# Patient Record
Sex: MALE | Race: OTHER
[De-identification: names, ages, dates, MRNs, and addresses within clinical notes are randomized per-mention and may not be internally consistent; named-entity substitution may affect disease eponyms.]

---

## 2019-08-26 NOTE — NUR
RN TELE ADMITTING NOTES 

RECEIVED PT FROM ER, VIA YESSICA, AWAKE ALERT ORIENTEDX4, ABLE TO AMBULATE TO BED. BREATHING 
MINIMALLY LABORED ON ROOM AIR BUT SATING AT 98%. COMPLAINS OF CHEST PRESSURE RADIATING TO 
THE LEFT ARM, REPORTS HE HAS NOT TAKEN HIS BP MEDS IN ABOUT 2 MONTHS BECAUSE HE DOES NOT 
KNOW WHICH ONES THEY ARE. APPEARS SHAKY, REPORTS TAKING LIBRIUM IN THE MORNING, TRYING TO 
QUIT ALCOHOL, [facility] IV ACCESS ON THE L FA18G SL. 
BELONGINGS LIST COMPLETED, CONNECTED TO HEART MONITOR, SR IN THE 70S. BED IN LOWEST LOCKED 
POSITION, CALL LIGHT WITHIN REACH AT ALL TIMES, WILL CONTINUE TO MONITOR FREQUENTLY.

## 2019-08-26 NOTE — NUR
REPORT GIVEN TO BRO SARKAR FOR SHAWN; [facility]

## 2019-08-26 NOTE — NUR
"[id number], FROM [place] IN THE BOX, C/O CHEST PAIN FOR YEARS AND WORST TODAY

+N/V x 2, 2 SPRAY OF NITRO  ASA GIVEN BY EMS, ADMITS DRINKING TODAY" PT 
AAOX4, -SOB, NAD NOTED, VSS ,PENDING MD LAWSON

## 2019-08-27 NOTE — NUR
RN CLOSING NOTES

PT AWAKE AND RESTING IN BED. PATIENT GIVEN LIBRIUM X2 TODAY FOR ALCOHOL WITHDRAWAL SYMPTOMS. 
PT HAS LEFT FA #18 IV INTACT AND PATENT, AND RIGHT AC #18 IV INTACT AND RUNNING NS @75ML/HR. 
SAFETY PRECAUTIONS IN PLACE, BED IN LOWEST LOCKED POSITION, X2 SIDE RAILS UP AND CALL LIGHT 
WITHIN REACH. WILL ENDORSE TO NIGHT SHIFT NURSE FOR CONTINUITY OF CARE.

## 2019-08-27 NOTE — NUR
MS RN OPENING NOTES



Patient received in bed, alert, oriented x 4. Breathing even and unlabored. Not in any 
distress, on room air. Peripheral IV infusing at 75mL/hr. No complaints of pain at this 
time. Safety measures in place; call light within reach, bed in low, locked position. Will 
continue to monitor accordingly

## 2019-08-27 NOTE — NUR
RN TELE NOTES - PT /102, CONTINUES TO COMPLAIN OF CHEST PAIN, ANA MARIA PACHECO, ORDERED 
1GM OF NITRO OINTMENT, WILL ADMINISTER AS PRESCRIBED.

## 2019-08-27 NOTE — NUR
RN TELE CLOSING NOTES 

PT REMAINS IN BED, SLEEPING EASILY AROUSED TO NAME CALL. BREATHING EVEN AND UNLABORED ON 
ROOM AIR. IN NO APPARENT PAIN OR DISCOMFORT AT THIS TIME.  IV ACCESS ON THE L FA18G NS 
@75ML/HR.  CONNECTED TO HEART MONITOR, SR IN THE 70S. BED IN LOWEST LOCKED POSITION, CALL 
LIGHT WITHIN REACH AT ALL TIMES, WILL ENDORSE TO DAY NURSE FOR CO C

## 2019-08-27 NOTE — NUR
RN NOTES

INFORMED PHARMACY THAT NITROL OINTMENT NOT IN PATIENT CASSETTE. WILL CONTINUE TO FOLLOW UP.

## 2019-08-28 NOTE — NUR
Nurse Notes:

    Report given the the night nurse, Ev RN, Librium is not scheduled, was seen by Dr Vargas.  patient still having chest pain.

## 2019-08-28 NOTE — NUR
Nurse Notes:

   started on magnesium bolus x 2, 1 hr each,  IV is in the right antecubital, it is not in 
the right forearm.  left arm is in the forearm.

## 2019-08-28 NOTE — NUR
RN CLOSING NOTES



PATIENT SLEEPING IN BED, EASY TO AROUSE. BREATHING EVEN AND UNLABORED. NOT IN ANY DISTRESS. 
PATIENT HAS LEFT FA #18 IV INTACT AND PATENT, AND RIGHT AC #18 IV INTACT AND PATENT WITH 
PERIPHERAL IV RUNNING NS @75ML/HR. SAFETY PRECAUTIONS IN PLACE, BED IN LOWEST LOCKED 
POSITION, X2 SIDE RAILS UP AND CALL LIGHT WITHIN REACH. ENDORSED SHAWN TO AM SHIFT RN

## 2019-08-28 NOTE — NUR
Nurse Notes:

    Dr Vargas is here on floor, patient librium needs to be ordered every 6 hours scheduled, 
and not prn.  Patient states gets shakiy.

## 2019-08-28 NOTE — NUR
Nurse Notes:

   assisted to the bathroom, using walker, patient stated feels wobbly,  patient had bowel 
movement,  stool was not observed.  patient flushed it.

## 2019-08-28 NOTE — NUR
MS RN NOTES

RECEIVED ON BED A/O X4,NO SOB BUT STILL HAVING CHEST PAIN.SEEN AND EXAMINED BY DR COATES,WITH NEW ORDERS NOTED.IVF DISCONTINUED AS ORDERED.NITRO PATCH IN PLACE ON LEFT CHEST 
WALL.CALL LIGHT IN REACH,NEEDS ANTICIPATED.

## 2019-08-29 NOTE — NUR
MS RN NOTES

SLEPT WELL WITH LIBRIUM.DENIES CHEST PAIN.SALINE LOCK,IV DISCONTINUE BY DR COATES.POSSIBLE 
DISCHARGE HOME TODAY.WILL ENDORSE TO DAY NURSE FOR SHAWN.

## 2019-08-29 NOTE — NUR
DISCHARGE NOTE



PT WAS D/C AT THIS TIME BACK IN MEDICALLY STABLE CONDITION. IV AND ID BAND WERE REMOVED. ALL 
EXITCARE, D/C PAPERWORK, AND BELONGINGS LIST WERE SIGNED, DISCUSSED AND HANDED TO THE PT. 
ALL BELONGINGS WERE TAKEN BY HIM AS WELL.MEDICATIONS WERE RETURNED TO HIM. SKIN WAS NOTED TO 
BE INTACT. ALL NEEDS WERE ATTENDED TO DURING HIS STAY. PT LEFT AT THIS TIME WHERE HE WAS 
WALKED DOWN BY ME. HE WAS GIVEN TAP CARD TO RIDE THE BUS BACK HOME AT THIS TIME

## 2019-08-29 NOTE — NUR
RN OPENING NOTE



PT WAS RECEIVED IN BED AT LOWEST AND LOCKED POSITION WITH SIDE RAILS UP X2, A/O X4 BREATHING 
EVEN AND UNLABORED ON RA, NO S/S OF ANY DISTRESS OR PAIN, IV IS PATENT AND INTACT, D/C 
PLANNING, SAFETY PRECAUTIONS IN PLACE, CALL LIGHT WITHIN REACH, WILL MONITOR PT ACCORDINGLY

## 2020-01-05 ENCOUNTER — HOSPITAL ENCOUNTER (EMERGENCY)
Dept: HOSPITAL 54 - ER | Age: 60
Discharge: HOME | End: 2020-01-05
Payer: COMMERCIAL

## 2020-01-05 VITALS — HEIGHT: 73 IN | WEIGHT: 221 LBS | BODY MASS INDEX: 29.29 KG/M2

## 2020-01-05 VITALS — SYSTOLIC BLOOD PRESSURE: 101 MMHG | DIASTOLIC BLOOD PRESSURE: 67 MMHG

## 2020-01-05 DIAGNOSIS — I10: ICD-10-CM

## 2020-01-05 DIAGNOSIS — R07.89: Primary | ICD-10-CM

## 2020-01-05 DIAGNOSIS — E78.00: ICD-10-CM

## 2020-01-05 DIAGNOSIS — Z79.899: ICD-10-CM

## 2020-01-05 LAB
ALBUMIN SERPL BCP-MCNC: 3.5 G/DL (ref 3.4–5)
ALP SERPL-CCNC: 66 U/L (ref 46–116)
ALT SERPL W P-5'-P-CCNC: 61 U/L (ref 12–78)
AST SERPL W P-5'-P-CCNC: 91 U/L (ref 15–37)
BASOPHILS # BLD AUTO: 0.1 /CMM (ref 0–0.2)
BASOPHILS NFR BLD AUTO: 2.4 % (ref 0–2)
BILIRUB DIRECT SERPL-MCNC: 0.2 MG/DL (ref 0–0.2)
BILIRUB SERPL-MCNC: 0.6 MG/DL (ref 0.2–1)
BUN SERPL-MCNC: 18 MG/DL (ref 7–18)
CALCIUM SERPL-MCNC: 8.4 MG/DL (ref 8.5–10.1)
CHLORIDE SERPL-SCNC: 101 MMOL/L (ref 98–107)
CO2 SERPL-SCNC: 31 MMOL/L (ref 21–32)
CREAT SERPL-MCNC: 1.3 MG/DL (ref 0.6–1.3)
EOSINOPHIL NFR BLD AUTO: 2.4 % (ref 0–6)
GLUCOSE SERPL-MCNC: 90 MG/DL (ref 74–106)
HCT VFR BLD AUTO: 38 % (ref 39–51)
HGB BLD-MCNC: 13 G/DL (ref 13.5–17.5)
LYMPHOCYTES NFR BLD AUTO: 2.1 /CMM (ref 0.8–4.8)
LYMPHOCYTES NFR BLD AUTO: 42.1 % (ref 20–44)
MCHC RBC AUTO-ENTMCNC: 34 G/DL (ref 31–36)
MCV RBC AUTO: 98 FL (ref 80–96)
MONOCYTES NFR BLD AUTO: 0.5 /CMM (ref 0.1–1.3)
MONOCYTES NFR BLD AUTO: 10.5 % (ref 2–12)
NEUTROPHILS # BLD AUTO: 2.1 /CMM (ref 1.8–8.9)
NEUTROPHILS NFR BLD AUTO: 42.6 % (ref 43–81)
NT-PROBNP SERPL-MCNC: 136 PG/ML (ref 0–125)
PLATELET # BLD AUTO: 147 /CMM (ref 150–450)
POTASSIUM SERPL-SCNC: 3.2 MMOL/L (ref 3.5–5.1)
PROT SERPL-MCNC: 7.3 G/DL (ref 6.4–8.2)
RBC # BLD AUTO: 3.88 MIL/UL (ref 4.5–6)
SODIUM SERPL-SCNC: 142 MMOL/L (ref 136–145)
WBC NRBC COR # BLD AUTO: 4.9 K/UL (ref 4.3–11)

## 2020-01-05 NOTE — NUR
PT PRESENTED TO THE ER WITH A C/O L SIDED SHARP CP THAT RADIATES TO THE LUE. PT 
STATED THAT HIS BILATERAL HANDS ARE NUMB. PT ALSO STATED THAT HE IS HAVING 
BLOOD IN HIS STOOL AND HAS DIARRHEA. PT STATED THAT HE DOES NOT SMOKE, BUT HAS 
PROBLEMS WITH ALCOHOL. PT STATED THAT HE HAS LOST A LOT OF WEIGHT IN THE LAST 
YEAR AND HAS LOST HIS APPETITE. PT WAS PLACED ON THE MONITOR AND CONTINUOUS 
PULSE OX. IV WAS STARTED IN [facility] AND BLOOD WAS DRAWN AND SENT TO LAB. WILL 
CONTINUE TO MONITOR THE PT.

## 2020-01-05 NOTE — NUR
IV removed. Catheter intact and site benign. Pressure and 4x4 applied to site. 
No bleeding noted. Patient discharged to home in stable condition. Written and 
verbal after care instructions given. Patient verbalizes understanding of 
instruction. PT REC'D A COPY OF HIS IMAGING FINDINGS AND ALL LABS. PT AMBULATED 
OUT WITH A STEADY GAIT. VSS.

## 2020-01-05 NOTE — NUR
PT LEFT BEFORE REC'ING THE TAP CARD. CALLED FOR PT IN LOBBY AND OUTSIDE, BUT 
WAS UNABLE TO FIND PT.

## 2020-03-14 ENCOUNTER — HOSPITAL ENCOUNTER (EMERGENCY)
Dept: HOSPITAL 54 - ER | Age: 60
Discharge: HOME | End: 2020-03-14
Payer: COMMERCIAL

## 2020-03-14 VITALS — HEIGHT: 72 IN | WEIGHT: 190 LBS | BODY MASS INDEX: 25.73 KG/M2

## 2020-03-14 VITALS — DIASTOLIC BLOOD PRESSURE: 72 MMHG | SYSTOLIC BLOOD PRESSURE: 133 MMHG

## 2020-03-14 DIAGNOSIS — M54.10: ICD-10-CM

## 2020-03-14 DIAGNOSIS — I10: ICD-10-CM

## 2020-03-14 DIAGNOSIS — K92.2: ICD-10-CM

## 2020-03-14 DIAGNOSIS — Z79.899: ICD-10-CM

## 2020-03-14 DIAGNOSIS — F32.9: ICD-10-CM

## 2020-03-14 DIAGNOSIS — R60.0: Primary | ICD-10-CM

## 2020-03-14 LAB
ALBUMIN SERPL BCP-MCNC: 4.1 G/DL (ref 3.4–5)
ALP SERPL-CCNC: 77 U/L (ref 46–116)
ALT SERPL W P-5'-P-CCNC: 74 U/L (ref 12–78)
AST SERPL W P-5'-P-CCNC: 73 U/L (ref 15–37)
BASOPHILS # BLD AUTO: 0.2 /CMM (ref 0–0.2)
BASOPHILS NFR BLD AUTO: 2.7 % (ref 0–2)
BILIRUB DIRECT SERPL-MCNC: 0.1 MG/DL (ref 0–0.2)
BILIRUB SERPL-MCNC: 0.4 MG/DL (ref 0.2–1)
BUN SERPL-MCNC: 15 MG/DL (ref 7–18)
CALCIUM SERPL-MCNC: 9.3 MG/DL (ref 8.5–10.1)
CHLORIDE SERPL-SCNC: 100 MMOL/L (ref 98–107)
CO2 SERPL-SCNC: 31 MMOL/L (ref 21–32)
CREAT SERPL-MCNC: 1.5 MG/DL (ref 0.6–1.3)
EOSINOPHIL NFR BLD AUTO: 4.6 % (ref 0–6)
GLUCOSE SERPL-MCNC: 93 MG/DL (ref 74–106)
HCT VFR BLD AUTO: 39 % (ref 39–51)
HGB BLD-MCNC: 13.2 G/DL (ref 13.5–17.5)
LYMPHOCYTES NFR BLD AUTO: 2.3 /CMM (ref 0.8–4.8)
LYMPHOCYTES NFR BLD AUTO: 35.8 % (ref 20–44)
MCHC RBC AUTO-ENTMCNC: 34 G/DL (ref 31–36)
MCV RBC AUTO: 102 FL (ref 80–96)
MONOCYTES NFR BLD AUTO: 0.5 /CMM (ref 0.1–1.3)
MONOCYTES NFR BLD AUTO: 7.1 % (ref 2–12)
NEUTROPHILS # BLD AUTO: 3.2 /CMM (ref 1.8–8.9)
NEUTROPHILS NFR BLD AUTO: 49.8 % (ref 43–81)
PLATELET # BLD AUTO: 240 /CMM (ref 150–450)
POTASSIUM SERPL-SCNC: 4.2 MMOL/L (ref 3.5–5.1)
PROT SERPL-MCNC: 8.1 G/DL (ref 6.4–8.2)
RBC # BLD AUTO: 3.79 MIL/UL (ref 4.5–6)
SODIUM SERPL-SCNC: 138 MMOL/L (ref 136–145)
WBC NRBC COR # BLD AUTO: 6.4 K/UL (ref 4.3–11)

## 2020-03-14 NOTE — NUR
PT  REC'D TO ER  C/O  CP  ON AND OFF FOR  YEARS   TODAY  PRESSURE AND  NUMBNESS 
 FINGERS     SR  EKG AWAITING EVALUATION BY ER PROVIDER.

## 2020-03-14 NOTE — NUR
PT. VERBALIZED UNDERSTANDING OF AFTERCARE INSTRUCTIONS.IV removed. Catheter 
intact and site benign. Pressure and 4x4 applied to site. No bleeding noted.

## 2021-02-07 ENCOUNTER — HOSPITAL ENCOUNTER (INPATIENT)
Dept: HOSPITAL 54 - ER | Age: 61
LOS: 8 days | Discharge: HOME | End: 2021-02-15
Attending: NURSE PRACTITIONER | Admitting: INTERNAL MEDICINE
Payer: COMMERCIAL

## 2021-02-07 VITALS — WEIGHT: 210 LBS | HEIGHT: 72 IN | BODY MASS INDEX: 28.44 KG/M2

## 2021-02-07 DIAGNOSIS — I10: ICD-10-CM

## 2021-02-07 DIAGNOSIS — R09.02: ICD-10-CM

## 2021-02-07 DIAGNOSIS — F10.231: Primary | ICD-10-CM

## 2021-02-07 DIAGNOSIS — N13.9: ICD-10-CM

## 2021-02-07 DIAGNOSIS — J39.2: ICD-10-CM

## 2021-02-07 DIAGNOSIS — J45.909: ICD-10-CM

## 2021-02-07 DIAGNOSIS — E66.9: ICD-10-CM

## 2021-02-07 DIAGNOSIS — R74.01: ICD-10-CM

## 2021-02-07 DIAGNOSIS — E87.5: ICD-10-CM

## 2021-02-07 DIAGNOSIS — Z79.51: ICD-10-CM

## 2021-02-07 DIAGNOSIS — M48.02: ICD-10-CM

## 2021-02-07 DIAGNOSIS — Z79.01: ICD-10-CM

## 2021-02-07 DIAGNOSIS — I16.0: ICD-10-CM

## 2021-02-07 DIAGNOSIS — E83.42: ICD-10-CM

## 2021-02-07 DIAGNOSIS — F41.9: ICD-10-CM

## 2021-02-07 DIAGNOSIS — J39.1: ICD-10-CM

## 2021-02-07 DIAGNOSIS — E87.6: ICD-10-CM

## 2021-02-07 DIAGNOSIS — G92: ICD-10-CM

## 2021-02-07 DIAGNOSIS — F17.200: ICD-10-CM

## 2021-02-07 DIAGNOSIS — N17.0: ICD-10-CM

## 2021-02-07 DIAGNOSIS — Z59.0: ICD-10-CM

## 2021-02-07 DIAGNOSIS — J44.9: ICD-10-CM

## 2021-02-07 DIAGNOSIS — F29: ICD-10-CM

## 2021-02-07 DIAGNOSIS — Z79.899: ICD-10-CM
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
## 2021-02-07 LAB
ALBUMIN SERPL BCP-MCNC: 4.1 G/DL (ref 3.4–5)
ALP SERPL-CCNC: 112 U/L (ref 46–116)
ALT SERPL W P-5'-P-CCNC: 99 U/L (ref 12–78)
AST SERPL W P-5'-P-CCNC: 133 U/L (ref 15–37)
BASOPHILS # BLD AUTO: 0.1 /CMM (ref 0–0.2)
BASOPHILS NFR BLD AUTO: 1.2 % (ref 0–2)
BILIRUB DIRECT SERPL-MCNC: 0.4 MG/DL (ref 0–0.2)
BILIRUB SERPL-MCNC: 1.2 MG/DL (ref 0.2–1)
BUN SERPL-MCNC: 18 MG/DL (ref 7–18)
CALCIUM SERPL-MCNC: 9 MG/DL (ref 8.5–10.1)
CHLORIDE SERPL-SCNC: 97 MMOL/L (ref 98–107)
CO2 SERPL-SCNC: 24 MMOL/L (ref 21–32)
CREAT SERPL-MCNC: 1.6 MG/DL (ref 0.6–1.3)
EOSINOPHIL NFR BLD AUTO: 0.6 % (ref 0–6)
GLUCOSE SERPL-MCNC: 69 MG/DL (ref 74–106)
HCT VFR BLD AUTO: 43 % (ref 39–51)
HGB BLD-MCNC: 14.5 G/DL (ref 13.5–17.5)
LYMPHOCYTES NFR BLD AUTO: 1.3 /CMM (ref 0.8–4.8)
LYMPHOCYTES NFR BLD AUTO: 22 % (ref 20–44)
MCHC RBC AUTO-ENTMCNC: 34 G/DL (ref 31–36)
MCV RBC AUTO: 100 FL (ref 80–96)
MONOCYTES NFR BLD AUTO: 0.5 /CMM (ref 0.1–1.3)
MONOCYTES NFR BLD AUTO: 8.7 % (ref 2–12)
NEUTROPHILS # BLD AUTO: 4.1 /CMM (ref 1.8–8.9)
NEUTROPHILS NFR BLD AUTO: 67.5 % (ref 43–81)
PLATELET # BLD AUTO: 154 /CMM (ref 150–450)
POTASSIUM SERPL-SCNC: 3.3 MMOL/L (ref 3.5–5.1)
PROT SERPL-MCNC: 8.5 G/DL (ref 6.4–8.2)
RBC # BLD AUTO: 4.34 MIL/UL (ref 4.5–6)
SODIUM SERPL-SCNC: 138 MMOL/L (ref 136–145)
WBC NRBC COR # BLD AUTO: 6.1 K/UL (ref 4.3–11)

## 2021-02-07 PROCEDURE — C9803 HOPD COVID-19 SPEC COLLECT: HCPCS

## 2021-02-07 PROCEDURE — G0480 DRUG TEST DEF 1-7 CLASSES: HCPCS

## 2021-02-07 PROCEDURE — G0378 HOSPITAL OBSERVATION PER HR: HCPCS

## 2021-02-07 RX ADMIN — FAMOTIDINE SCH MG: 10 INJECTION INTRAVENOUS at 23:26

## 2021-02-07 SDOH — ECONOMIC STABILITY - HOUSING INSECURITY: HOMELESSNESS: Z59.0

## 2021-02-07 NOTE — NUR
INFORMED DR. WHITESIDE BENADRYL 50MG IV WAS ADMINISTERED PRIOR TO DC ORDERS. PT 
STILL ON CONTINOUS CARDIAC MONITOR AND PULSE OX. RESPIRATIONS EVEN AND 
UNLABORED. NO ACUTE DISTRESS NOTED AT THIS TIME. WILL CONTINUE TO MONITOR

## 2021-02-07 NOTE — NUR
[facility] TRANSFER CENTER ([facility]/ [facility]) CALLED FOR 
HIGHER LEVEL OF CARE. ONLY ACCEPTING BURN OR OB.

## 2021-02-07 NOTE — NUR
SPOKE WITH GIANA FROM [facility] TRANSFER LINE, WILL FAX CLINICAL INFORMATION 
PER REQUEST AT THIS TIME

## 2021-02-07 NOTE — NUR
PT [id number] PT STS "FEEL LIKE MY THROAT IS CLOSING" PT IS AAOX4, NOT IN 
RESPIRATORY DISTRESS, HOOKED TO CARDIAC MONITOR, KEPT RESTED AND COMFORTABLE. 
WILL CONTINUE TO MONITOR.

## 2021-02-07 NOTE — NUR
CALL BACK FROM [facility] TRANSFER [place]. NO ICU BED AVAILABLE FOR PT.

## 2021-02-07 NOTE — NUR
[facility] TRANSFER CENTER CALLED FOR HIGHER LEVEL OF CARE. NO BEDS 
AVAILABLE. [facility] FAXED [phone]

## 2021-02-07 NOTE — NUR
-------------------------------------------------------------------------------

          *** Note undone in EDM - 02/07/21 at 2150 by MORGAN ***           

-------------------------------------------------------------------------------

CALL BACK FROM [facility] TRANSF[facility]. ICU BED AVAILABLE FOR PT.

## 2021-02-08 VITALS — DIASTOLIC BLOOD PRESSURE: 111 MMHG | SYSTOLIC BLOOD PRESSURE: 186 MMHG

## 2021-02-08 VITALS — SYSTOLIC BLOOD PRESSURE: 189 MMHG | DIASTOLIC BLOOD PRESSURE: 156 MMHG

## 2021-02-08 VITALS — DIASTOLIC BLOOD PRESSURE: 135 MMHG | SYSTOLIC BLOOD PRESSURE: 203 MMHG

## 2021-02-08 VITALS — DIASTOLIC BLOOD PRESSURE: 114 MMHG | SYSTOLIC BLOOD PRESSURE: 167 MMHG

## 2021-02-08 VITALS — DIASTOLIC BLOOD PRESSURE: 187 MMHG | SYSTOLIC BLOOD PRESSURE: 198 MMHG

## 2021-02-08 VITALS — SYSTOLIC BLOOD PRESSURE: 142 MMHG | DIASTOLIC BLOOD PRESSURE: 43 MMHG

## 2021-02-08 VITALS — DIASTOLIC BLOOD PRESSURE: 122 MMHG | SYSTOLIC BLOOD PRESSURE: 187 MMHG

## 2021-02-08 VITALS — DIASTOLIC BLOOD PRESSURE: 110 MMHG | SYSTOLIC BLOOD PRESSURE: 69 MMHG

## 2021-02-08 VITALS — DIASTOLIC BLOOD PRESSURE: 110 MMHG | SYSTOLIC BLOOD PRESSURE: 168 MMHG

## 2021-02-08 VITALS — SYSTOLIC BLOOD PRESSURE: 185 MMHG | DIASTOLIC BLOOD PRESSURE: 125 MMHG

## 2021-02-08 VITALS — DIASTOLIC BLOOD PRESSURE: 99 MMHG | SYSTOLIC BLOOD PRESSURE: 151 MMHG

## 2021-02-08 VITALS — DIASTOLIC BLOOD PRESSURE: 117 MMHG | SYSTOLIC BLOOD PRESSURE: 178 MMHG

## 2021-02-08 VITALS — SYSTOLIC BLOOD PRESSURE: 170 MMHG | DIASTOLIC BLOOD PRESSURE: 128 MMHG

## 2021-02-08 VITALS — DIASTOLIC BLOOD PRESSURE: 119 MMHG | SYSTOLIC BLOOD PRESSURE: 189 MMHG

## 2021-02-08 VITALS — SYSTOLIC BLOOD PRESSURE: 163 MMHG | DIASTOLIC BLOOD PRESSURE: 112 MMHG

## 2021-02-08 VITALS — DIASTOLIC BLOOD PRESSURE: 127 MMHG | SYSTOLIC BLOOD PRESSURE: 195 MMHG

## 2021-02-08 VITALS — DIASTOLIC BLOOD PRESSURE: 111 MMHG | SYSTOLIC BLOOD PRESSURE: 160 MMHG

## 2021-02-08 VITALS — DIASTOLIC BLOOD PRESSURE: 113 MMHG | SYSTOLIC BLOOD PRESSURE: 173 MMHG

## 2021-02-08 VITALS — DIASTOLIC BLOOD PRESSURE: 102 MMHG | SYSTOLIC BLOOD PRESSURE: 191 MMHG

## 2021-02-08 VITALS — SYSTOLIC BLOOD PRESSURE: 160 MMHG | DIASTOLIC BLOOD PRESSURE: 114 MMHG

## 2021-02-08 VITALS — SYSTOLIC BLOOD PRESSURE: 186 MMHG | DIASTOLIC BLOOD PRESSURE: 135 MMHG

## 2021-02-08 VITALS — DIASTOLIC BLOOD PRESSURE: 114 MMHG | SYSTOLIC BLOOD PRESSURE: 173 MMHG

## 2021-02-08 VITALS — SYSTOLIC BLOOD PRESSURE: 170 MMHG | DIASTOLIC BLOOD PRESSURE: 99 MMHG

## 2021-02-08 VITALS — DIASTOLIC BLOOD PRESSURE: 86 MMHG | SYSTOLIC BLOOD PRESSURE: 139 MMHG

## 2021-02-08 VITALS — SYSTOLIC BLOOD PRESSURE: 189 MMHG | DIASTOLIC BLOOD PRESSURE: 124 MMHG

## 2021-02-08 VITALS — DIASTOLIC BLOOD PRESSURE: 100 MMHG | SYSTOLIC BLOOD PRESSURE: 189 MMHG

## 2021-02-08 VITALS — DIASTOLIC BLOOD PRESSURE: 103 MMHG | SYSTOLIC BLOOD PRESSURE: 178 MMHG

## 2021-02-08 VITALS — DIASTOLIC BLOOD PRESSURE: 122 MMHG | SYSTOLIC BLOOD PRESSURE: 184 MMHG

## 2021-02-08 VITALS — SYSTOLIC BLOOD PRESSURE: 151 MMHG | DIASTOLIC BLOOD PRESSURE: 113 MMHG

## 2021-02-08 RX ADMIN — FAMOTIDINE SCH MG: 10 INJECTION INTRAVENOUS at 09:05

## 2021-02-08 RX ADMIN — FAMOTIDINE SCH MG: 10 INJECTION INTRAVENOUS at 20:28

## 2021-02-08 RX ADMIN — LORAZEPAM PRN MG: 2 INJECTION INTRAMUSCULAR; INTRAVENOUS at 09:05

## 2021-02-08 RX ADMIN — CLONIDINE SCH EA: 0.3 PATCH TRANSDERMAL at 16:49

## 2021-02-08 RX ADMIN — ENALAPRILAT PRN MG: 1.25 INJECTION, SOLUTION INTRAVENOUS at 14:02

## 2021-02-08 RX ADMIN — LORAZEPAM PRN MG: 2 INJECTION INTRAMUSCULAR; INTRAVENOUS at 13:26

## 2021-02-08 RX ADMIN — LORAZEPAM PRN MG: 2 INJECTION INTRAMUSCULAR; INTRAVENOUS at 21:45

## 2021-02-08 RX ADMIN — PIPERACILLIN SODIUM AND TAZOBACTAM SODIUM SCH MLS/HR: .375; 3 INJECTION, POWDER, LYOPHILIZED, FOR SOLUTION INTRAVENOUS at 16:49

## 2021-02-08 RX ADMIN — LORAZEPAM PRN MG: 2 INJECTION INTRAMUSCULAR; INTRAVENOUS at 23:53

## 2021-02-08 RX ADMIN — SODIUM CHLORIDE, SODIUM LACTATE, POTASSIUM CHLORIDE, AND CALCIUM CHLORIDE SCH MLS/HR: .6; .31; .03; .02 INJECTION, SOLUTION INTRAVENOUS at 18:36

## 2021-02-08 RX ADMIN — SODIUM CHLORIDE, SODIUM LACTATE, POTASSIUM CHLORIDE, AND CALCIUM CHLORIDE SCH MLS/HR: .6; .31; .03; .02 INJECTION, SOLUTION INTRAVENOUS at 09:35

## 2021-02-08 RX ADMIN — ENALAPRILAT PRN MG: 1.25 INJECTION, SOLUTION INTRAVENOUS at 20:42

## 2021-02-08 RX ADMIN — LORAZEPAM PRN MG: 2 INJECTION INTRAMUSCULAR; INTRAVENOUS at 17:29

## 2021-02-08 NOTE — NUR
RN OPENING NOTES



RECEIVED PT IN BED. CONFUSED. PT IS ON 2L OF O2 VIA NC, TOLERATING WELL. SATURATION IS 98%. 
NO S/S OF RESP DISTRESS OR SOB. PT DOES HAS PRESENCE OF TREMORS, SHAKINESS. HX OF ALCOHOL 
ABUSE, DX DELIRIUM TREMENS. PT IS AFEBRILE, PT DENIES PAIN. PT IS NPO PEND SWALLOW EVAL 
ORDERED FOR TOMORROW. IV SITES FLUSHED. PT HAS D5 1/2 N2 + 10MEQ KCL NO S/S OF INFILTRATION 
NOTED. PT HAS GILLIAM CATH DRAINING TO GRAVITY. YELLOW WITH SEDIMENT. PT ON SOFT KARI WRIST 
RESTRAINTS. SKIN CHECKED. CIRCULATION CHECKED. WILL CONT TO ASSESS AS NEEDED. SAFETY 
MEASURES IN PLACE. SEIZURE PRECAUTION IN PLACE. PADDED SIDE RAILS. HOB ELEVATED. SIDE RAILS 
UP X3, BED LOCKED IN LOWEST POSITION WITH BED ALARM ON. CALL LIGHT WITHIN REACH.

## 2021-02-08 NOTE — NUR
RN CLOSING NOTES



PATIENT STABLE, LESS AGITATED, BILATERAL SOFT WRIST RESTRAINT ON FOR PATIENT'S SAFETY, BED 
ALARMS ON, IVF RUNNING AT 100ML/HR, MD AWARE OF BP, SIDE RAIL UP X3, BED LOCKED IN LOWEST 
POSITION, NO, S/S OF SEIZURE NOTED, WILL ENDORSE CONTINUITY OF CARE TO NIGHT NURSE.

## 2021-02-08 NOTE — NUR
PATIENT AROUSABLE TO STIMULI. ABLE TO VERBALIZE NEEDS. INSERTED A GILLIAM CATH FR 
16, AS ORDERED BY DR. SALINAS. PATIENT KEPT COMFORTABLE.

## 2021-02-08 NOTE — NUR
-------------------------------------------------------------------------------

             *** Note undone in [facility] - 02/08/21 at 0811 by JUNIOR ***             

-------------------------------------------------------------------------------

TASKEN UP TO ASSIGNED ROOM

## 2021-02-08 NOTE — NUR
NOTED TACHYCARDIA AND DIAPHORETIC. ALSO NOTED MORE NOTICABLE "GURGLING SOUND" 
WHILE PATIENT IS SPEAKING. PT ABLE TO SPEAK IN FULL SENTENCES, O2 SAT 98% ROOM 
AIR. RESPIRATIONS EVEN AND UNLABORED. NO ACUTE DISTRESS NOTED AT THIS TIME. DR. MCDONALD AT BEDSIDE FOR EVALUATION. PT STILL ON MONITOR, WILL CONTINUE TO MONITOR

## 2021-02-08 NOTE — NUR
PT PULLED OUT IV. IV removed. Catheter intact and site benign. Pressure and 4x4 
applied to site. No bleeding noted. New IV inserted L AC 18g

## 2021-02-08 NOTE — NUR
RN NOTES 

PT RECEIVED FROM ER IN ROOM 250, RESTLESS AND CONFUSED, GARBLED SPEECH, TRYING TO GET OUT OF 
THE BED, KICKING AND PULLING AN PUSHING,  HR 'S , ST, SBP 'S , DR KRISTAL BARBOZA, ORDER RECEIVED FOR SOFT RESTRAINS ,   GILLIAM DRINING TO GRAVITY, L HAND AND L AC IV 
SITES CLEAN, DRY AND INTACT, SR UP x3, CALL LIGHT WITHIN EASY REACH, BED LOCKED AND IN 
LOWEST POSITION, CONTINUE TO MONITOR.

## 2021-02-08 NOTE — NUR
PATIENT TRANSFERRED TO ROOM 250 VIA ACLS PROTOCOL. IN STABLE CONDITION. ON 3LPM 
VIA NC WITH SPO2 OF 98%. BELONGINGS TRANSFERRED AS WELL. ENDORSED TO NAVYA SARKAR.

## 2021-02-08 NOTE — NUR
PT AWAKE, SPEAKING INCOHERENTLY. CONTANTLY ATTEMPTING TO GET OUT OF BED BUT 
REDIRECTABLE. NOTED TREMORS, TACHYCARDIA, AND HYPERTENSION. PT ALSO 
DIAPHORETIC. DR. SALINAS MADE AWARE. PT STILL ON CONTINOUS CARDIAC MONITOR 
AND PULSE OX, WILL CONTINUE TO MONITOR

## 2021-02-09 VITALS — SYSTOLIC BLOOD PRESSURE: 193 MMHG | DIASTOLIC BLOOD PRESSURE: 100 MMHG

## 2021-02-09 VITALS — DIASTOLIC BLOOD PRESSURE: 151 MMHG | SYSTOLIC BLOOD PRESSURE: 188 MMHG

## 2021-02-09 VITALS — DIASTOLIC BLOOD PRESSURE: 119 MMHG | SYSTOLIC BLOOD PRESSURE: 164 MMHG

## 2021-02-09 VITALS — SYSTOLIC BLOOD PRESSURE: 169 MMHG | DIASTOLIC BLOOD PRESSURE: 116 MMHG

## 2021-02-09 VITALS — DIASTOLIC BLOOD PRESSURE: 116 MMHG | SYSTOLIC BLOOD PRESSURE: 182 MMHG

## 2021-02-09 VITALS — SYSTOLIC BLOOD PRESSURE: 186 MMHG | DIASTOLIC BLOOD PRESSURE: 131 MMHG

## 2021-02-09 VITALS — SYSTOLIC BLOOD PRESSURE: 183 MMHG | DIASTOLIC BLOOD PRESSURE: 133 MMHG

## 2021-02-09 VITALS — SYSTOLIC BLOOD PRESSURE: 180 MMHG | DIASTOLIC BLOOD PRESSURE: 143 MMHG

## 2021-02-09 VITALS — DIASTOLIC BLOOD PRESSURE: 141 MMHG | SYSTOLIC BLOOD PRESSURE: 200 MMHG

## 2021-02-09 VITALS — DIASTOLIC BLOOD PRESSURE: 106 MMHG | SYSTOLIC BLOOD PRESSURE: 203 MMHG

## 2021-02-09 VITALS — SYSTOLIC BLOOD PRESSURE: 188 MMHG | DIASTOLIC BLOOD PRESSURE: 116 MMHG

## 2021-02-09 VITALS — SYSTOLIC BLOOD PRESSURE: 156 MMHG | DIASTOLIC BLOOD PRESSURE: 112 MMHG

## 2021-02-09 VITALS — DIASTOLIC BLOOD PRESSURE: 121 MMHG | SYSTOLIC BLOOD PRESSURE: 182 MMHG

## 2021-02-09 VITALS — SYSTOLIC BLOOD PRESSURE: 169 MMHG | DIASTOLIC BLOOD PRESSURE: 110 MMHG

## 2021-02-09 VITALS — DIASTOLIC BLOOD PRESSURE: 132 MMHG | SYSTOLIC BLOOD PRESSURE: 195 MMHG

## 2021-02-09 VITALS — DIASTOLIC BLOOD PRESSURE: 131 MMHG | SYSTOLIC BLOOD PRESSURE: 182 MMHG

## 2021-02-09 VITALS — DIASTOLIC BLOOD PRESSURE: 116 MMHG | SYSTOLIC BLOOD PRESSURE: 188 MMHG

## 2021-02-09 VITALS — DIASTOLIC BLOOD PRESSURE: 100 MMHG | SYSTOLIC BLOOD PRESSURE: 193 MMHG

## 2021-02-09 VITALS — SYSTOLIC BLOOD PRESSURE: 162 MMHG | DIASTOLIC BLOOD PRESSURE: 106 MMHG

## 2021-02-09 VITALS — SYSTOLIC BLOOD PRESSURE: 216 MMHG | DIASTOLIC BLOOD PRESSURE: 131 MMHG

## 2021-02-09 VITALS — DIASTOLIC BLOOD PRESSURE: 150 MMHG | SYSTOLIC BLOOD PRESSURE: 188 MMHG

## 2021-02-09 VITALS — SYSTOLIC BLOOD PRESSURE: 174 MMHG | DIASTOLIC BLOOD PRESSURE: 115 MMHG

## 2021-02-09 VITALS — SYSTOLIC BLOOD PRESSURE: 217 MMHG | DIASTOLIC BLOOD PRESSURE: 155 MMHG

## 2021-02-09 VITALS — SYSTOLIC BLOOD PRESSURE: 175 MMHG | DIASTOLIC BLOOD PRESSURE: 130 MMHG

## 2021-02-09 VITALS — SYSTOLIC BLOOD PRESSURE: 149 MMHG | DIASTOLIC BLOOD PRESSURE: 100 MMHG

## 2021-02-09 VITALS — DIASTOLIC BLOOD PRESSURE: 87 MMHG | SYSTOLIC BLOOD PRESSURE: 182 MMHG

## 2021-02-09 VITALS — DIASTOLIC BLOOD PRESSURE: 100 MMHG | SYSTOLIC BLOOD PRESSURE: 159 MMHG

## 2021-02-09 VITALS — SYSTOLIC BLOOD PRESSURE: 176 MMHG | DIASTOLIC BLOOD PRESSURE: 87 MMHG

## 2021-02-09 VITALS — DIASTOLIC BLOOD PRESSURE: 116 MMHG | SYSTOLIC BLOOD PRESSURE: 172 MMHG

## 2021-02-09 VITALS — SYSTOLIC BLOOD PRESSURE: 216 MMHG | DIASTOLIC BLOOD PRESSURE: 136 MMHG

## 2021-02-09 VITALS — DIASTOLIC BLOOD PRESSURE: 120 MMHG | SYSTOLIC BLOOD PRESSURE: 178 MMHG

## 2021-02-09 VITALS — DIASTOLIC BLOOD PRESSURE: 114 MMHG | SYSTOLIC BLOOD PRESSURE: 198 MMHG

## 2021-02-09 VITALS — SYSTOLIC BLOOD PRESSURE: 165 MMHG | DIASTOLIC BLOOD PRESSURE: 121 MMHG

## 2021-02-09 VITALS — SYSTOLIC BLOOD PRESSURE: 169 MMHG | DIASTOLIC BLOOD PRESSURE: 113 MMHG

## 2021-02-09 VITALS — DIASTOLIC BLOOD PRESSURE: 126 MMHG | SYSTOLIC BLOOD PRESSURE: 183 MMHG

## 2021-02-09 VITALS — SYSTOLIC BLOOD PRESSURE: 178 MMHG | DIASTOLIC BLOOD PRESSURE: 105 MMHG

## 2021-02-09 VITALS — SYSTOLIC BLOOD PRESSURE: 167 MMHG | DIASTOLIC BLOOD PRESSURE: 111 MMHG

## 2021-02-09 VITALS — SYSTOLIC BLOOD PRESSURE: 194 MMHG | DIASTOLIC BLOOD PRESSURE: 126 MMHG

## 2021-02-09 VITALS — DIASTOLIC BLOOD PRESSURE: 122 MMHG | SYSTOLIC BLOOD PRESSURE: 181 MMHG

## 2021-02-09 VITALS — SYSTOLIC BLOOD PRESSURE: 189 MMHG | DIASTOLIC BLOOD PRESSURE: 115 MMHG

## 2021-02-09 VITALS — DIASTOLIC BLOOD PRESSURE: 110 MMHG | SYSTOLIC BLOOD PRESSURE: 160 MMHG

## 2021-02-09 VITALS — DIASTOLIC BLOOD PRESSURE: 135 MMHG | SYSTOLIC BLOOD PRESSURE: 198 MMHG

## 2021-02-09 VITALS — DIASTOLIC BLOOD PRESSURE: 130 MMHG | SYSTOLIC BLOOD PRESSURE: 204 MMHG

## 2021-02-09 VITALS — SYSTOLIC BLOOD PRESSURE: 162 MMHG | DIASTOLIC BLOOD PRESSURE: 119 MMHG

## 2021-02-09 VITALS — SYSTOLIC BLOOD PRESSURE: 158 MMHG | DIASTOLIC BLOOD PRESSURE: 120 MMHG

## 2021-02-09 VITALS — SYSTOLIC BLOOD PRESSURE: 159 MMHG | DIASTOLIC BLOOD PRESSURE: 113 MMHG

## 2021-02-09 LAB
ALBUMIN SERPL BCP-MCNC: 3.4 G/DL (ref 3.4–5)
ALP SERPL-CCNC: 80 U/L (ref 46–116)
ALT SERPL W P-5'-P-CCNC: 74 U/L (ref 12–78)
AST SERPL W P-5'-P-CCNC: 81 U/L (ref 15–37)
BASOPHILS # BLD AUTO: 0 /CMM (ref 0–0.2)
BASOPHILS NFR BLD AUTO: 0.1 % (ref 0–2)
BILIRUB DIRECT SERPL-MCNC: 0.3 MG/DL (ref 0–0.2)
BILIRUB SERPL-MCNC: 0.9 MG/DL (ref 0.2–1)
BUN SERPL-MCNC: 25 MG/DL (ref 7–18)
CALCIUM SERPL-MCNC: 8.2 MG/DL (ref 8.5–10.1)
CHLORIDE SERPL-SCNC: 104 MMOL/L (ref 98–107)
CO2 SERPL-SCNC: 27 MMOL/L (ref 21–32)
CREAT SERPL-MCNC: 1.2 MG/DL (ref 0.6–1.3)
EOSINOPHIL NFR BLD AUTO: 0 % (ref 0–6)
GLUCOSE SERPL-MCNC: 165 MG/DL (ref 74–106)
HCT VFR BLD AUTO: 39 % (ref 39–51)
HGB BLD-MCNC: 13 G/DL (ref 13.5–17.5)
LYMPHOCYTES NFR BLD AUTO: 0.3 /CMM (ref 0.8–4.8)
LYMPHOCYTES NFR BLD AUTO: 6.5 % (ref 20–44)
MAGNESIUM SERPL-MCNC: 1.4 MG/DL (ref 1.8–2.4)
MCHC RBC AUTO-ENTMCNC: 33 G/DL (ref 31–36)
MCV RBC AUTO: 101 FL (ref 80–96)
MONOCYTES NFR BLD AUTO: 0.2 /CMM (ref 0.1–1.3)
MONOCYTES NFR BLD AUTO: 4.5 % (ref 2–12)
NEUTROPHILS # BLD AUTO: 4.6 /CMM (ref 1.8–8.9)
NEUTROPHILS NFR BLD AUTO: 88.9 % (ref 43–81)
PHOSPHATE SERPL-MCNC: 2.8 MG/DL (ref 2.5–4.9)
PLATELET # BLD AUTO: 125 /CMM (ref 150–450)
POTASSIUM SERPL-SCNC: 3.9 MMOL/L (ref 3.5–5.1)
PROT SERPL-MCNC: 7.3 G/DL (ref 6.4–8.2)
RBC # BLD AUTO: 3.85 MIL/UL (ref 4.5–6)
SODIUM SERPL-SCNC: 141 MMOL/L (ref 136–145)
WBC NRBC COR # BLD AUTO: 5.2 K/UL (ref 4.3–11)

## 2021-02-09 RX ADMIN — NITROGLYCERIN SCH GM: 20 OINTMENT TOPICAL at 17:00

## 2021-02-09 RX ADMIN — NITROGLYCERIN SCH GM: 20 OINTMENT TOPICAL at 12:21

## 2021-02-09 RX ADMIN — LORAZEPAM PRN MG: 2 INJECTION INTRAMUSCULAR; INTRAVENOUS at 18:25

## 2021-02-09 RX ADMIN — LORAZEPAM PRN MG: 2 INJECTION INTRAMUSCULAR; INTRAVENOUS at 23:24

## 2021-02-09 RX ADMIN — LORAZEPAM PRN MG: 2 INJECTION INTRAMUSCULAR; INTRAVENOUS at 13:24

## 2021-02-09 RX ADMIN — PIPERACILLIN SODIUM AND TAZOBACTAM SODIUM SCH MLS/HR: .375; 3 INJECTION, POWDER, LYOPHILIZED, FOR SOLUTION INTRAVENOUS at 09:07

## 2021-02-09 RX ADMIN — LORAZEPAM PRN MG: 2 INJECTION INTRAMUSCULAR; INTRAVENOUS at 08:13

## 2021-02-09 RX ADMIN — ENALAPRILAT PRN MG: 1.25 INJECTION, SOLUTION INTRAVENOUS at 13:24

## 2021-02-09 RX ADMIN — SODIUM CHLORIDE, SODIUM LACTATE, POTASSIUM CHLORIDE, AND CALCIUM CHLORIDE SCH MLS/HR: .6; .31; .03; .02 INJECTION, SOLUTION INTRAVENOUS at 08:12

## 2021-02-09 RX ADMIN — MAGNESIUM SULFATE IN DEXTROSE SCH MLS/HR: 10 INJECTION, SOLUTION INTRAVENOUS at 11:28

## 2021-02-09 RX ADMIN — SODIUM CHLORIDE, SODIUM LACTATE, POTASSIUM CHLORIDE, AND CALCIUM CHLORIDE SCH MLS/HR: .6; .31; .03; .02 INJECTION, SOLUTION INTRAVENOUS at 13:35

## 2021-02-09 RX ADMIN — FAMOTIDINE SCH MG: 10 INJECTION INTRAVENOUS at 08:11

## 2021-02-09 RX ADMIN — ENALAPRILAT PRN MG: 1.25 INJECTION, SOLUTION INTRAVENOUS at 08:35

## 2021-02-09 RX ADMIN — LORAZEPAM PRN MG: 2 INJECTION INTRAMUSCULAR; INTRAVENOUS at 20:34

## 2021-02-09 RX ADMIN — PIPERACILLIN SODIUM AND TAZOBACTAM SODIUM SCH MLS/HR: .375; 3 INJECTION, POWDER, LYOPHILIZED, FOR SOLUTION INTRAVENOUS at 00:01

## 2021-02-09 RX ADMIN — MAGNESIUM SULFATE IN DEXTROSE SCH MLS/HR: 10 INJECTION, SOLUTION INTRAVENOUS at 14:53

## 2021-02-09 RX ADMIN — ENALAPRILAT PRN MG: 1.25 INJECTION, SOLUTION INTRAVENOUS at 18:34

## 2021-02-09 RX ADMIN — MAGNESIUM SULFATE IN DEXTROSE SCH MLS/HR: 10 INJECTION, SOLUTION INTRAVENOUS at 12:31

## 2021-02-09 RX ADMIN — NITROGLYCERIN SCH GM: 20 OINTMENT TOPICAL at 23:23

## 2021-02-09 RX ADMIN — LABETALOL HYDROCHLORIDE PRN MG: 5 INJECTION, SOLUTION INTRAVENOUS at 20:15

## 2021-02-09 RX ADMIN — FAMOTIDINE SCH MG: 10 INJECTION INTRAVENOUS at 20:15

## 2021-02-09 RX ADMIN — SODIUM CHLORIDE, SODIUM LACTATE, POTASSIUM CHLORIDE, AND CALCIUM CHLORIDE SCH MLS/HR: .6; .31; .03; .02 INJECTION, SOLUTION INTRAVENOUS at 23:54

## 2021-02-09 RX ADMIN — PIPERACILLIN SODIUM AND TAZOBACTAM SODIUM SCH MLS/HR: .375; 3 INJECTION, POWDER, LYOPHILIZED, FOR SOLUTION INTRAVENOUS at 16:48

## 2021-02-09 RX ADMIN — ENALAPRILAT PRN MG: 1.25 INJECTION, SOLUTION INTRAVENOUS at 02:52

## 2021-02-09 RX ADMIN — MAGNESIUM SULFATE IN DEXTROSE SCH MLS/HR: 10 INJECTION, SOLUTION INTRAVENOUS at 13:33

## 2021-02-09 NOTE — NUR
PATIENT IN BED. PATIENT ON RESTRAINTS, IN CONFUSED STATE DUE TO WITHDRAWAL. PATIENT ON 4L O2 
VIA NC, SATURATING WELL. PATIENT ON CARDIAC MONITOR, ST NOTED WITH HR IN 110S. PATIENT FC IN 
PLACE, INTACT, DRAINING TO GRAVITY. PATIENT NPO STATUS ACKNOWLEDGED. PATIENT L AC IV ACCESS 
AND L HAND IV ACCESS INTACT, PATENT. PATIENT RESTRAINTS IN PLACE. PATIENT SAFETY MEASURES 
MAINTAINED. SITTER IN PLACE. WILL ENDORSE PLAN OF CARE TO ONCOMING SHIFT

## 2021-02-09 NOTE — NUR
RECEIVED PATIENT IN BED. PATIENT ON RESTRAINTS, IN CONFUSED STATE DUE TO WITHDRAWAL. PATIENT 
ON 2L O2 VIA NC, SATURATING WELL. PATIENT ON CARDIAC MONITOR, NSR NOTED WITH HR IN 90S, BUT 
PER REPORT HR GOES INTO 110S. PATIENT FC IN PLACE, INTACT, DRAINING TO GRAVITY. PATIENT NPO 
STATUS ACKNOWLEDGED. PATIENT L AC IV ACCESS AND L HAND IV ACCESS INTACT, PATENT. PATIENT 
RESTRAINTS IN PLACE. PATIENT SAFETY MEASURES MAINTAINED. SITTER IN PLACE. WILL CONTINUE TO 
MONITOR.

## 2021-02-09 NOTE — NUR
RN CLOSING NOTES



PT IN SAME CONDITION, NO SIGNIFICANT CHANGE. AT THIS TIME PT STILL ON 2L OF O2, NO RESP 
DISTRESS. PT IS CONFUSED. RESTLESS, OCCASIONALLY KICKS OUT. STILL ON SOFT AKRI WRIST 
RESTRAINTS. BED BATH DONE. LINENS AND GOWN CHANGED. TOWARDS END OF SHIFT, HEMATURIA PRESENT. 
ENDORSED TO AM NURSE. PT IVF RAN OUT, CALLED PHARMACY SAYS WILL BRING UP WHEN READY. PT 
DENIES PAIN. AFEBRILE. SAFETY MEASURES IN PLACE. HOB ELEVATED. SIDE RAILS UP. NO S/S OF 
SEIZURE ACTIVITY. BED LOCKED IN LOWEST POSITION BED ALARM ON. ENDORSED TO AM NURSE FOR CONT 
OF CARE.

## 2021-02-09 NOTE — NUR
SS consult received for Alcohol withdrawal. SW called the unit to determine pt.'s level of 
consciousness. Per pt.'s nurse, the pt. is not intubated however, not interviewable at this 
time. SW will attempt to interview patient at a later time.

## 2021-02-09 NOTE — NUR
REPORTED TO ONCALL DR. ROSARIO THAT PT HAVE A HIGH BLOOD PRESSURE IS HIGH FOR A WHOLE 
DAY WITH LATEST /150  AND PT IS NPO AND DID NOT PASS ST EVAL WITH ORDER MADE FOR 
LABETALOL 10MG Q4H PRN FOR SBP> 160 NOTED AND CARRIED OUT

## 2021-02-09 NOTE — NUR
RECEIVED PT IN BED. CONFUSED. PT IS ON 2L OF O2 VIA NC, TOLERATING WELL. SATURATION IS 98%. 
NO S/S OF RESP DISTRESS OR SOB. PT DOES HAS PRESENCE OF TREMORS, SHAKINESS. HX OF ALCOHOL 
ABUSE, DX DELIRIUM TREMENS. PT IS AFEBRILE, NO PAIN NOTED. PT IS NPO PENDING SWALLOW EVAL . 
IV SITES FLUSHED. PT HAS D5 1/2 N2 + 10MEQ KCL NO S/S OF INFILTRATION NOTED. PT HAS GILLIAM 
CATH DRAINING TO GRAVITY. TEA COLOR WITH SEDIMENT. PT ON SOFT KARI WRIST RESTRAINTS. SKIN 
CHECKED. CIRCULATION CHECKED.SITTER AT BED SIDE . SAFETY MEASURES IN PLACE. SEIZURE 
PRECAUTION IN PLACE. PADDED SIDE RAILS. HOB ELEVATED. SIDE RAILS UP X3, BED LOCKED IN LOWEST 
POSITION WITH BED ALARM ON. CALL LIGHT WITHIN REACH.

## 2021-02-09 NOTE — NUR
PATIENT POTASSIUM CHLORIDE ADMINISTERED LATE THIS AM DUE TO LATE DELIVERY AND PATIENT 
NON-COMPLIANCE. SO PATIENT CURRENT BAG OF POTASSIUM CHLORIDE STILL FULL. NON-ADMIN SCHEDULED 
BAG FOR 1409.

## 2021-02-10 VITALS — DIASTOLIC BLOOD PRESSURE: 69 MMHG | SYSTOLIC BLOOD PRESSURE: 137 MMHG

## 2021-02-10 VITALS — SYSTOLIC BLOOD PRESSURE: 198 MMHG | DIASTOLIC BLOOD PRESSURE: 114 MMHG

## 2021-02-10 VITALS — DIASTOLIC BLOOD PRESSURE: 101 MMHG | SYSTOLIC BLOOD PRESSURE: 162 MMHG

## 2021-02-10 VITALS — SYSTOLIC BLOOD PRESSURE: 119 MMHG | DIASTOLIC BLOOD PRESSURE: 70 MMHG

## 2021-02-10 VITALS — SYSTOLIC BLOOD PRESSURE: 152 MMHG | DIASTOLIC BLOOD PRESSURE: 93 MMHG

## 2021-02-10 VITALS — DIASTOLIC BLOOD PRESSURE: 102 MMHG | SYSTOLIC BLOOD PRESSURE: 134 MMHG

## 2021-02-10 VITALS — SYSTOLIC BLOOD PRESSURE: 128 MMHG | DIASTOLIC BLOOD PRESSURE: 75 MMHG

## 2021-02-10 VITALS — DIASTOLIC BLOOD PRESSURE: 107 MMHG | SYSTOLIC BLOOD PRESSURE: 157 MMHG

## 2021-02-10 VITALS — SYSTOLIC BLOOD PRESSURE: 167 MMHG | DIASTOLIC BLOOD PRESSURE: 112 MMHG

## 2021-02-10 VITALS — DIASTOLIC BLOOD PRESSURE: 98 MMHG | SYSTOLIC BLOOD PRESSURE: 168 MMHG

## 2021-02-10 VITALS — DIASTOLIC BLOOD PRESSURE: 91 MMHG | SYSTOLIC BLOOD PRESSURE: 163 MMHG

## 2021-02-10 VITALS — DIASTOLIC BLOOD PRESSURE: 75 MMHG | SYSTOLIC BLOOD PRESSURE: 121 MMHG

## 2021-02-10 VITALS — DIASTOLIC BLOOD PRESSURE: 118 MMHG | SYSTOLIC BLOOD PRESSURE: 169 MMHG

## 2021-02-10 VITALS — SYSTOLIC BLOOD PRESSURE: 185 MMHG | DIASTOLIC BLOOD PRESSURE: 133 MMHG

## 2021-02-10 VITALS — SYSTOLIC BLOOD PRESSURE: 133 MMHG | DIASTOLIC BLOOD PRESSURE: 84 MMHG

## 2021-02-10 VITALS — DIASTOLIC BLOOD PRESSURE: 67 MMHG | SYSTOLIC BLOOD PRESSURE: 115 MMHG

## 2021-02-10 VITALS — DIASTOLIC BLOOD PRESSURE: 80 MMHG | SYSTOLIC BLOOD PRESSURE: 145 MMHG

## 2021-02-10 VITALS — DIASTOLIC BLOOD PRESSURE: 92 MMHG | SYSTOLIC BLOOD PRESSURE: 154 MMHG

## 2021-02-10 VITALS — DIASTOLIC BLOOD PRESSURE: 100 MMHG | SYSTOLIC BLOOD PRESSURE: 157 MMHG

## 2021-02-10 VITALS — DIASTOLIC BLOOD PRESSURE: 89 MMHG | SYSTOLIC BLOOD PRESSURE: 147 MMHG

## 2021-02-10 VITALS — DIASTOLIC BLOOD PRESSURE: 83 MMHG | SYSTOLIC BLOOD PRESSURE: 159 MMHG

## 2021-02-10 VITALS — SYSTOLIC BLOOD PRESSURE: 156 MMHG | DIASTOLIC BLOOD PRESSURE: 88 MMHG

## 2021-02-10 VITALS — SYSTOLIC BLOOD PRESSURE: 160 MMHG | DIASTOLIC BLOOD PRESSURE: 91 MMHG

## 2021-02-10 VITALS — DIASTOLIC BLOOD PRESSURE: 86 MMHG | SYSTOLIC BLOOD PRESSURE: 185 MMHG

## 2021-02-10 VITALS — SYSTOLIC BLOOD PRESSURE: 173 MMHG | DIASTOLIC BLOOD PRESSURE: 115 MMHG

## 2021-02-10 VITALS — DIASTOLIC BLOOD PRESSURE: 96 MMHG | SYSTOLIC BLOOD PRESSURE: 144 MMHG

## 2021-02-10 VITALS — SYSTOLIC BLOOD PRESSURE: 189 MMHG | DIASTOLIC BLOOD PRESSURE: 90 MMHG

## 2021-02-10 VITALS — SYSTOLIC BLOOD PRESSURE: 167 MMHG | DIASTOLIC BLOOD PRESSURE: 107 MMHG

## 2021-02-10 VITALS — DIASTOLIC BLOOD PRESSURE: 85 MMHG | SYSTOLIC BLOOD PRESSURE: 119 MMHG

## 2021-02-10 VITALS — DIASTOLIC BLOOD PRESSURE: 107 MMHG | SYSTOLIC BLOOD PRESSURE: 197 MMHG

## 2021-02-10 VITALS — SYSTOLIC BLOOD PRESSURE: 159 MMHG | DIASTOLIC BLOOD PRESSURE: 83 MMHG

## 2021-02-10 VITALS — SYSTOLIC BLOOD PRESSURE: 185 MMHG | DIASTOLIC BLOOD PRESSURE: 136 MMHG

## 2021-02-10 VITALS — DIASTOLIC BLOOD PRESSURE: 86 MMHG | SYSTOLIC BLOOD PRESSURE: 135 MMHG

## 2021-02-10 VITALS — SYSTOLIC BLOOD PRESSURE: 224 MMHG | DIASTOLIC BLOOD PRESSURE: 149 MMHG

## 2021-02-10 VITALS — DIASTOLIC BLOOD PRESSURE: 83 MMHG | SYSTOLIC BLOOD PRESSURE: 142 MMHG

## 2021-02-10 VITALS — SYSTOLIC BLOOD PRESSURE: 174 MMHG | DIASTOLIC BLOOD PRESSURE: 98 MMHG

## 2021-02-10 VITALS — SYSTOLIC BLOOD PRESSURE: 122 MMHG | DIASTOLIC BLOOD PRESSURE: 70 MMHG

## 2021-02-10 VITALS — DIASTOLIC BLOOD PRESSURE: 60 MMHG | SYSTOLIC BLOOD PRESSURE: 133 MMHG

## 2021-02-10 VITALS — DIASTOLIC BLOOD PRESSURE: 99 MMHG | SYSTOLIC BLOOD PRESSURE: 161 MMHG

## 2021-02-10 VITALS — SYSTOLIC BLOOD PRESSURE: 179 MMHG | DIASTOLIC BLOOD PRESSURE: 108 MMHG

## 2021-02-10 VITALS — DIASTOLIC BLOOD PRESSURE: 128 MMHG | SYSTOLIC BLOOD PRESSURE: 169 MMHG

## 2021-02-10 VITALS — SYSTOLIC BLOOD PRESSURE: 143 MMHG | DIASTOLIC BLOOD PRESSURE: 101 MMHG

## 2021-02-10 VITALS — DIASTOLIC BLOOD PRESSURE: 79 MMHG | SYSTOLIC BLOOD PRESSURE: 122 MMHG

## 2021-02-10 VITALS — SYSTOLIC BLOOD PRESSURE: 134 MMHG | DIASTOLIC BLOOD PRESSURE: 79 MMHG

## 2021-02-10 VITALS — SYSTOLIC BLOOD PRESSURE: 163 MMHG | DIASTOLIC BLOOD PRESSURE: 102 MMHG

## 2021-02-10 VITALS — DIASTOLIC BLOOD PRESSURE: 89 MMHG | SYSTOLIC BLOOD PRESSURE: 134 MMHG

## 2021-02-10 VITALS — DIASTOLIC BLOOD PRESSURE: 104 MMHG | SYSTOLIC BLOOD PRESSURE: 158 MMHG

## 2021-02-10 VITALS — DIASTOLIC BLOOD PRESSURE: 100 MMHG | SYSTOLIC BLOOD PRESSURE: 158 MMHG

## 2021-02-10 VITALS — DIASTOLIC BLOOD PRESSURE: 79 MMHG | SYSTOLIC BLOOD PRESSURE: 140 MMHG

## 2021-02-10 VITALS — DIASTOLIC BLOOD PRESSURE: 113 MMHG | SYSTOLIC BLOOD PRESSURE: 153 MMHG

## 2021-02-10 VITALS — DIASTOLIC BLOOD PRESSURE: 84 MMHG | SYSTOLIC BLOOD PRESSURE: 154 MMHG

## 2021-02-10 VITALS — SYSTOLIC BLOOD PRESSURE: 156 MMHG | DIASTOLIC BLOOD PRESSURE: 100 MMHG

## 2021-02-10 VITALS — SYSTOLIC BLOOD PRESSURE: 170 MMHG | DIASTOLIC BLOOD PRESSURE: 117 MMHG

## 2021-02-10 VITALS — SYSTOLIC BLOOD PRESSURE: 153 MMHG | DIASTOLIC BLOOD PRESSURE: 83 MMHG

## 2021-02-10 VITALS — DIASTOLIC BLOOD PRESSURE: 86 MMHG | SYSTOLIC BLOOD PRESSURE: 180 MMHG

## 2021-02-10 VITALS — SYSTOLIC BLOOD PRESSURE: 140 MMHG | DIASTOLIC BLOOD PRESSURE: 65 MMHG

## 2021-02-10 VITALS — SYSTOLIC BLOOD PRESSURE: 156 MMHG | DIASTOLIC BLOOD PRESSURE: 83 MMHG

## 2021-02-10 LAB
BASE EXCESS BLDA CALC-SCNC: -2.2 MMOL/L
BASOPHILS # BLD AUTO: 0 /CMM (ref 0–0.2)
BASOPHILS NFR BLD AUTO: 0 % (ref 0–2)
BUN SERPL-MCNC: 29 MG/DL (ref 7–18)
CALCIUM SERPL-MCNC: 8.1 MG/DL (ref 8.5–10.1)
CHLORIDE SERPL-SCNC: 105 MMOL/L (ref 98–107)
CO2 SERPL-SCNC: 23 MMOL/L (ref 21–32)
CREAT SERPL-MCNC: 1.5 MG/DL (ref 0.6–1.3)
DO-HGB MFR BLDA: 155.8 MMHG
EOSINOPHIL NFR BLD AUTO: 0 % (ref 0–6)
GLUCOSE SERPL-MCNC: 182 MG/DL (ref 74–106)
HCT VFR BLD AUTO: 38 % (ref 39–51)
HGB BLD-MCNC: 12.7 G/DL (ref 13.5–17.5)
INHALED O2 CONCENTRATION: 50 %
INHALED O2 FLOW RATE: 10 L/MIN (ref 0–30)
LYMPHOCYTES NFR BLD AUTO: 0.3 /CMM (ref 0.8–4.8)
LYMPHOCYTES NFR BLD AUTO: 5.5 % (ref 20–44)
MCHC RBC AUTO-ENTMCNC: 34 G/DL (ref 31–36)
MCV RBC AUTO: 100 FL (ref 80–96)
MONOCYTES NFR BLD AUTO: 0.3 /CMM (ref 0.1–1.3)
MONOCYTES NFR BLD AUTO: 6 % (ref 2–12)
NEUTROPHILS # BLD AUTO: 5.2 /CMM (ref 1.8–8.9)
NEUTROPHILS NFR BLD AUTO: 88.5 % (ref 43–81)
PCO2 TEMP ADJ BLDA: 36.5 MMHG (ref 35–45)
PH TEMP ADJ BLDA: 7.4 [PH] (ref 7.35–7.45)
PLATELET # BLD AUTO: 128 /CMM (ref 150–450)
PO2 TEMP ADJ BLDA: 159.6 MMHG (ref 75–100)
POTASSIUM SERPL-SCNC: 3.9 MMOL/L (ref 3.5–5.1)
RBC # BLD AUTO: 3.79 MIL/UL (ref 4.5–6)
SAO2 % BLDA: 99 % (ref 92–98.5)
SODIUM SERPL-SCNC: 141 MMOL/L (ref 136–145)
VENTILATION MODE VENT: (no result)
WBC NRBC COR # BLD AUTO: 5.8 K/UL (ref 4.3–11)

## 2021-02-10 PROCEDURE — 05HA33Z INSERTION OF INFUSION DEVICE INTO LEFT BRACHIAL VEIN, PERCUTANEOUS APPROACH: ICD-10-PCS | Performed by: NURSE PRACTITIONER

## 2021-02-10 RX ADMIN — LABETALOL HYDROCHLORIDE PRN MG: 5 INJECTION, SOLUTION INTRAVENOUS at 07:58

## 2021-02-10 RX ADMIN — FAMOTIDINE SCH MG: 10 INJECTION INTRAVENOUS at 21:17

## 2021-02-10 RX ADMIN — PIPERACILLIN SODIUM AND TAZOBACTAM SODIUM SCH MLS/HR: .375; 3 INJECTION, POWDER, LYOPHILIZED, FOR SOLUTION INTRAVENOUS at 08:57

## 2021-02-10 RX ADMIN — LABETALOL HYDROCHLORIDE PRN MG: 5 INJECTION, SOLUTION INTRAVENOUS at 18:28

## 2021-02-10 RX ADMIN — Medication SCH MG: at 13:19

## 2021-02-10 RX ADMIN — FAMOTIDINE SCH MG: 10 INJECTION INTRAVENOUS at 08:57

## 2021-02-10 RX ADMIN — CHLORDIAZEPOXIDE HYDROCHLORIDE SCH MG: 25 CAPSULE ORAL at 17:13

## 2021-02-10 RX ADMIN — LABETALOL HYDROCHLORIDE PRN MG: 5 INJECTION, SOLUTION INTRAVENOUS at 00:30

## 2021-02-10 RX ADMIN — SODIUM CHLORIDE, SODIUM LACTATE, POTASSIUM CHLORIDE, AND CALCIUM CHLORIDE SCH MLS/HR: .6; .31; .03; .02 INJECTION, SOLUTION INTRAVENOUS at 20:12

## 2021-02-10 RX ADMIN — PIPERACILLIN SODIUM AND TAZOBACTAM SODIUM SCH MLS/HR: .375; 3 INJECTION, POWDER, LYOPHILIZED, FOR SOLUTION INTRAVENOUS at 00:30

## 2021-02-10 RX ADMIN — CHLORDIAZEPOXIDE HYDROCHLORIDE SCH MG: 25 CAPSULE ORAL at 13:19

## 2021-02-10 RX ADMIN — ENALAPRILAT PRN MG: 1.25 INJECTION, SOLUTION INTRAVENOUS at 03:36

## 2021-02-10 RX ADMIN — SODIUM CHLORIDE, SODIUM LACTATE, POTASSIUM CHLORIDE, AND CALCIUM CHLORIDE SCH MLS/HR: .6; .31; .03; .02 INJECTION, SOLUTION INTRAVENOUS at 07:01

## 2021-02-10 RX ADMIN — SODIUM CHLORIDE, SODIUM LACTATE, POTASSIUM CHLORIDE, AND CALCIUM CHLORIDE SCH MLS/HR: .6; .31; .03; .02 INJECTION, SOLUTION INTRAVENOUS at 13:25

## 2021-02-10 RX ADMIN — PIPERACILLIN SODIUM AND TAZOBACTAM SODIUM SCH MLS/HR: .375; 3 INJECTION, POWDER, LYOPHILIZED, FOR SOLUTION INTRAVENOUS at 17:13

## 2021-02-10 RX ADMIN — NITROGLYCERIN SCH GM: 20 OINTMENT TOPICAL at 17:13

## 2021-02-10 RX ADMIN — NITROGLYCERIN SCH GM: 20 OINTMENT TOPICAL at 12:00

## 2021-02-10 RX ADMIN — NITROGLYCERIN SCH GM: 20 OINTMENT TOPICAL at 23:49

## 2021-02-10 RX ADMIN — LORAZEPAM PRN MG: 2 INJECTION INTRAMUSCULAR; INTRAVENOUS at 03:34

## 2021-02-10 RX ADMIN — NITROGLYCERIN SCH GM: 20 OINTMENT TOPICAL at 05:22

## 2021-02-10 RX ADMIN — LABETALOL HYDROCHLORIDE PRN MG: 5 INJECTION, SOLUTION INTRAVENOUS at 08:56

## 2021-02-10 RX ADMIN — LORAZEPAM PRN MG: 2 INJECTION INTRAMUSCULAR; INTRAVENOUS at 01:39

## 2021-02-10 NOTE — NUR
REPORTED TO ON CALL DR. DUUQE THAT PT IS SO AGITATED DESPITE THE ATIVAN 4MG IV Q2H AND HE 
ALSO DONT HAVE AN OUTPUT SINCE THE BEGINNING OF THE SHIFT WITH ORDER TO INCREASE THE IVF TO 
150ML/HR AND GIVE EXTRA ATIVAN 2MG IV X1 NOW NOTED AND CARRIED OUT

## 2021-02-10 NOTE — NUR
RN ICU OPENING NOTE



PT SLEEPING IN BED STILL ON O2 6L VIA MASK SPO2 100%, NO SIGNS OF RESP DISTRESS. PT SINUS 
RHYTHM 70'S WITH SITTER AT BED SIDE, BILATERAL WRIST RESTRAINTS IN PLACE, CMS INTACT. PT HAS 
LT AC #18 INFUSING D5 1/2NS 10 MEQ KCL @ 150ML/HR, NO SIGNS OF INFECTION OR INFILTRATION. 
WILL PUT IN ORDER FOR MIDLINE INSERTION SHORTLY. ALL SAFETY PRECAUTIONS IN PLACE. WILL CONT 
TO MONITOR

## 2021-02-10 NOTE — NUR
RN ICU CLOSING NOTE



PT IN STABLE CONDITION. NO CHANGES TO PT STATUS. PT ON NC 4LPM, SP02 OF 99%, NO SIGN OF RESP 
DISTRESS OR SOB. ALL PT SAFETY PRECAUTIONS IN PLACE. WILL ENDORSE SHAWN TO ONCOMING RN

## 2021-02-10 NOTE — NUR
RN NOTE



PER DR ROMERO, OK TO GIVE PT PO MEDS IN HIGH FOWLERS POSITION EVEN THOUGH SPEECH THERAPY CAME 
BY FOR SWALLOW EVAL AND FAILED HIM. WILL MONITOR CLOSELY

## 2021-02-10 NOTE — NUR
PT SLEEPING ON BED STILL ON O2 6L VIA MASK SPO2 100% ON  SINUS RHYTHM 70'S SITTER AT BED 
SIDE STILL WITH BILATERAL WRIST RESTRAINTS NO SIGNIFICANT CHANGES ON CONDITION NOTED, ALL 
NEEDS ATTENDED, PRN FOR ANXIETY AND AGITATION WAS GIVEN BED ON LOWEST POSITION AND LOCKED 
SIDE RAILS UP X2 WILL ENDORSED TO AM SHIFT NURSE

## 2021-02-10 NOTE — NUR
PT STILL VERY AGITATED Q2H PRN ATIVAN ALWAYS ADMINISTERED SITTER AT BED SIDE SPO2 98% STILL 
BP IS HIGH WILL CONT TO MONITOR

## 2021-02-11 VITALS — DIASTOLIC BLOOD PRESSURE: 86 MMHG | SYSTOLIC BLOOD PRESSURE: 151 MMHG

## 2021-02-11 VITALS — DIASTOLIC BLOOD PRESSURE: 115 MMHG | SYSTOLIC BLOOD PRESSURE: 172 MMHG

## 2021-02-11 VITALS — SYSTOLIC BLOOD PRESSURE: 149 MMHG | DIASTOLIC BLOOD PRESSURE: 86 MMHG

## 2021-02-11 VITALS — SYSTOLIC BLOOD PRESSURE: 170 MMHG | DIASTOLIC BLOOD PRESSURE: 94 MMHG

## 2021-02-11 VITALS — DIASTOLIC BLOOD PRESSURE: 89 MMHG | SYSTOLIC BLOOD PRESSURE: 152 MMHG

## 2021-02-11 VITALS — SYSTOLIC BLOOD PRESSURE: 159 MMHG | DIASTOLIC BLOOD PRESSURE: 106 MMHG

## 2021-02-11 VITALS — DIASTOLIC BLOOD PRESSURE: 103 MMHG | SYSTOLIC BLOOD PRESSURE: 183 MMHG

## 2021-02-11 VITALS — DIASTOLIC BLOOD PRESSURE: 117 MMHG | SYSTOLIC BLOOD PRESSURE: 170 MMHG

## 2021-02-11 VITALS — DIASTOLIC BLOOD PRESSURE: 103 MMHG | SYSTOLIC BLOOD PRESSURE: 156 MMHG

## 2021-02-11 VITALS — DIASTOLIC BLOOD PRESSURE: 84 MMHG | SYSTOLIC BLOOD PRESSURE: 139 MMHG

## 2021-02-11 VITALS — DIASTOLIC BLOOD PRESSURE: 106 MMHG | SYSTOLIC BLOOD PRESSURE: 155 MMHG

## 2021-02-11 VITALS — DIASTOLIC BLOOD PRESSURE: 94 MMHG | SYSTOLIC BLOOD PRESSURE: 151 MMHG

## 2021-02-11 VITALS — SYSTOLIC BLOOD PRESSURE: 155 MMHG | DIASTOLIC BLOOD PRESSURE: 95 MMHG

## 2021-02-11 VITALS — SYSTOLIC BLOOD PRESSURE: 175 MMHG | DIASTOLIC BLOOD PRESSURE: 124 MMHG

## 2021-02-11 VITALS — SYSTOLIC BLOOD PRESSURE: 152 MMHG | DIASTOLIC BLOOD PRESSURE: 92 MMHG

## 2021-02-11 VITALS — DIASTOLIC BLOOD PRESSURE: 93 MMHG | SYSTOLIC BLOOD PRESSURE: 169 MMHG

## 2021-02-11 VITALS — SYSTOLIC BLOOD PRESSURE: 153 MMHG | DIASTOLIC BLOOD PRESSURE: 98 MMHG

## 2021-02-11 VITALS — DIASTOLIC BLOOD PRESSURE: 99 MMHG | SYSTOLIC BLOOD PRESSURE: 154 MMHG

## 2021-02-11 VITALS — DIASTOLIC BLOOD PRESSURE: 99 MMHG | SYSTOLIC BLOOD PRESSURE: 174 MMHG

## 2021-02-11 VITALS — SYSTOLIC BLOOD PRESSURE: 156 MMHG | DIASTOLIC BLOOD PRESSURE: 100 MMHG

## 2021-02-11 VITALS — SYSTOLIC BLOOD PRESSURE: 172 MMHG | DIASTOLIC BLOOD PRESSURE: 105 MMHG

## 2021-02-11 VITALS — SYSTOLIC BLOOD PRESSURE: 165 MMHG | DIASTOLIC BLOOD PRESSURE: 102 MMHG

## 2021-02-11 VITALS — DIASTOLIC BLOOD PRESSURE: 86 MMHG | SYSTOLIC BLOOD PRESSURE: 123 MMHG

## 2021-02-11 VITALS — DIASTOLIC BLOOD PRESSURE: 79 MMHG | SYSTOLIC BLOOD PRESSURE: 136 MMHG

## 2021-02-11 VITALS — DIASTOLIC BLOOD PRESSURE: 108 MMHG | SYSTOLIC BLOOD PRESSURE: 152 MMHG

## 2021-02-11 VITALS — DIASTOLIC BLOOD PRESSURE: 96 MMHG | SYSTOLIC BLOOD PRESSURE: 178 MMHG

## 2021-02-11 VITALS — DIASTOLIC BLOOD PRESSURE: 86 MMHG | SYSTOLIC BLOOD PRESSURE: 156 MMHG

## 2021-02-11 VITALS — DIASTOLIC BLOOD PRESSURE: 93 MMHG | SYSTOLIC BLOOD PRESSURE: 178 MMHG

## 2021-02-11 VITALS — DIASTOLIC BLOOD PRESSURE: 99 MMHG | SYSTOLIC BLOOD PRESSURE: 168 MMHG

## 2021-02-11 VITALS — DIASTOLIC BLOOD PRESSURE: 84 MMHG | SYSTOLIC BLOOD PRESSURE: 135 MMHG

## 2021-02-11 VITALS — SYSTOLIC BLOOD PRESSURE: 183 MMHG | DIASTOLIC BLOOD PRESSURE: 102 MMHG

## 2021-02-11 VITALS — DIASTOLIC BLOOD PRESSURE: 110 MMHG | SYSTOLIC BLOOD PRESSURE: 174 MMHG

## 2021-02-11 VITALS — DIASTOLIC BLOOD PRESSURE: 99 MMHG | SYSTOLIC BLOOD PRESSURE: 145 MMHG

## 2021-02-11 VITALS — DIASTOLIC BLOOD PRESSURE: 88 MMHG | SYSTOLIC BLOOD PRESSURE: 151 MMHG

## 2021-02-11 VITALS — SYSTOLIC BLOOD PRESSURE: 145 MMHG | DIASTOLIC BLOOD PRESSURE: 81 MMHG

## 2021-02-11 VITALS — SYSTOLIC BLOOD PRESSURE: 149 MMHG | DIASTOLIC BLOOD PRESSURE: 90 MMHG

## 2021-02-11 VITALS — SYSTOLIC BLOOD PRESSURE: 163 MMHG | DIASTOLIC BLOOD PRESSURE: 101 MMHG

## 2021-02-11 RX ADMIN — FAMOTIDINE SCH MG: 10 INJECTION INTRAVENOUS at 21:04

## 2021-02-11 RX ADMIN — NITROGLYCERIN SCH GM: 20 OINTMENT TOPICAL at 05:51

## 2021-02-11 RX ADMIN — PIPERACILLIN SODIUM AND TAZOBACTAM SODIUM SCH MLS/HR: .375; 3 INJECTION, POWDER, LYOPHILIZED, FOR SOLUTION INTRAVENOUS at 16:38

## 2021-02-11 RX ADMIN — SODIUM CHLORIDE, SODIUM LACTATE, POTASSIUM CHLORIDE, AND CALCIUM CHLORIDE SCH MLS/HR: .6; .31; .03; .02 INJECTION, SOLUTION INTRAVENOUS at 15:56

## 2021-02-11 RX ADMIN — LABETALOL HCL SCH MG: 100 TABLET, FILM COATED ORAL at 21:00

## 2021-02-11 RX ADMIN — LORAZEPAM PRN MG: 2 INJECTION INTRAMUSCULAR; INTRAVENOUS at 00:56

## 2021-02-11 RX ADMIN — LABETALOL HCL SCH MG: 100 TABLET, FILM COATED ORAL at 17:32

## 2021-02-11 RX ADMIN — Medication SCH EACH: at 17:25

## 2021-02-11 RX ADMIN — PIPERACILLIN SODIUM AND TAZOBACTAM SODIUM SCH MLS/HR: .375; 3 INJECTION, POWDER, LYOPHILIZED, FOR SOLUTION INTRAVENOUS at 08:05

## 2021-02-11 RX ADMIN — INSULIN HUMAN PRN UNIT: 100 INJECTION, SOLUTION PARENTERAL at 17:24

## 2021-02-11 RX ADMIN — Medication SCH MG: at 08:15

## 2021-02-11 RX ADMIN — SODIUM CHLORIDE, SODIUM LACTATE, POTASSIUM CHLORIDE, AND CALCIUM CHLORIDE SCH MLS/HR: .6; .31; .03; .02 INJECTION, SOLUTION INTRAVENOUS at 02:15

## 2021-02-11 RX ADMIN — FAMOTIDINE SCH MG: 10 INJECTION INTRAVENOUS at 08:15

## 2021-02-11 RX ADMIN — LABETALOL HYDROCHLORIDE PRN MG: 5 INJECTION, SOLUTION INTRAVENOUS at 14:18

## 2021-02-11 RX ADMIN — Medication SCH EACH: at 21:30

## 2021-02-11 RX ADMIN — SODIUM CHLORIDE, SODIUM LACTATE, POTASSIUM CHLORIDE, AND CALCIUM CHLORIDE SCH MLS/HR: .6; .31; .03; .02 INJECTION, SOLUTION INTRAVENOUS at 22:45

## 2021-02-11 RX ADMIN — ENALAPRILAT PRN MG: 1.25 INJECTION, SOLUTION INTRAVENOUS at 16:43

## 2021-02-11 RX ADMIN — SODIUM CHLORIDE, SODIUM LACTATE, POTASSIUM CHLORIDE, AND CALCIUM CHLORIDE SCH MLS/HR: .6; .31; .03; .02 INJECTION, SOLUTION INTRAVENOUS at 09:06

## 2021-02-11 RX ADMIN — NITROGLYCERIN SCH GM: 20 OINTMENT TOPICAL at 23:24

## 2021-02-11 RX ADMIN — PIPERACILLIN SODIUM AND TAZOBACTAM SODIUM SCH MLS/HR: .375; 3 INJECTION, POWDER, LYOPHILIZED, FOR SOLUTION INTRAVENOUS at 00:48

## 2021-02-11 RX ADMIN — ENALAPRILAT PRN MG: 1.25 INJECTION, SOLUTION INTRAVENOUS at 02:15

## 2021-02-11 RX ADMIN — LORAZEPAM PRN MG: 2 INJECTION INTRAMUSCULAR; INTRAVENOUS at 14:03

## 2021-02-11 RX ADMIN — CHLORDIAZEPOXIDE HYDROCHLORIDE SCH MG: 25 CAPSULE ORAL at 16:38

## 2021-02-11 RX ADMIN — INSULIN HUMAN PRN UNIT: 100 INJECTION, SOLUTION PARENTERAL at 21:28

## 2021-02-11 RX ADMIN — CHLORDIAZEPOXIDE HYDROCHLORIDE SCH MG: 25 CAPSULE ORAL at 08:15

## 2021-02-11 RX ADMIN — NITROGLYCERIN SCH GM: 20 OINTMENT TOPICAL at 12:35

## 2021-02-11 RX ADMIN — NITROGLYCERIN SCH GM: 20 OINTMENT TOPICAL at 17:08

## 2021-02-11 NOTE — NUR
Consult:  consult was requested by TSORM Torres for alcohol 
withdrawal. Pt is a 60 year old male who was admitted to [facility] on 2/8/21 to 
the Intensive Care Unit due to alcohol withdrawal. SW attempted to interview the pt at 
bedside but the pt appeared to be highly agitated and verbally aggressive. Pt stated, "Leave 
me the fuck alone." Pt appeared to be confused and disorganized as well. Due to the SW being 
unable to assess this pt, SW left substance abuse referrals on the pts desk and placed a 
copy in the chart. SW asked the pt to review them when he has a chance.

## 2021-02-11 NOTE — NUR
icu rn. initial assessment. received the pt rest on the bed. awake, alert, follow commands. 
oxygen 4l n/c. cardiac monitor showing nsr. iv rt upper arm mid line. ivf d51/2ns in 10 meq 
k @150ml/h. hob elevated. fc patent. urine draining. afebrile.  sitter at bed side. will 
continue to monitor vitals.

## 2021-02-11 NOTE — NUR
ICU/LVN

PT'S BLOOD PRESSURE HAS BEEN IN 'S 'S, NOTIFED THE CHARGE NURSE WHO THEN GAVE 
VASOTEC PRN IVP. WILL CONTINUE TO MONITOR THIS PT'S BLOOD PRESSURE.

## 2021-02-11 NOTE — NUR
ICU/LVN

PT WAS AGITATED, NOTIFED CHARGE NURSE WHO THEN GAVE ATIVAN PRN IVP. WILL CONTINUE TO MONITOR 
THIS PT AND HIS AGITATION.

## 2021-02-11 NOTE — NUR
RN NOTES



MD ON UNIT, MADE AWARE OF HIGH BP, GIVEN ALL AS NEEDED MED FOR SBP >160. NO NEW ORDERS AT 
THIS TIME.  WILL CONTINUE TO MONITOR.

## 2021-02-12 VITALS — SYSTOLIC BLOOD PRESSURE: 180 MMHG | DIASTOLIC BLOOD PRESSURE: 104 MMHG

## 2021-02-12 VITALS — SYSTOLIC BLOOD PRESSURE: 165 MMHG | DIASTOLIC BLOOD PRESSURE: 116 MMHG

## 2021-02-12 VITALS — DIASTOLIC BLOOD PRESSURE: 103 MMHG | SYSTOLIC BLOOD PRESSURE: 168 MMHG

## 2021-02-12 VITALS — SYSTOLIC BLOOD PRESSURE: 159 MMHG | DIASTOLIC BLOOD PRESSURE: 87 MMHG

## 2021-02-12 VITALS — DIASTOLIC BLOOD PRESSURE: 99 MMHG | SYSTOLIC BLOOD PRESSURE: 165 MMHG

## 2021-02-12 VITALS — DIASTOLIC BLOOD PRESSURE: 71 MMHG | SYSTOLIC BLOOD PRESSURE: 126 MMHG

## 2021-02-12 VITALS — SYSTOLIC BLOOD PRESSURE: 180 MMHG | DIASTOLIC BLOOD PRESSURE: 99 MMHG

## 2021-02-12 VITALS — DIASTOLIC BLOOD PRESSURE: 104 MMHG | SYSTOLIC BLOOD PRESSURE: 167 MMHG

## 2021-02-12 VITALS — DIASTOLIC BLOOD PRESSURE: 101 MMHG | SYSTOLIC BLOOD PRESSURE: 161 MMHG

## 2021-02-12 VITALS — DIASTOLIC BLOOD PRESSURE: 106 MMHG | SYSTOLIC BLOOD PRESSURE: 182 MMHG

## 2021-02-12 VITALS — DIASTOLIC BLOOD PRESSURE: 107 MMHG | SYSTOLIC BLOOD PRESSURE: 173 MMHG

## 2021-02-12 VITALS — SYSTOLIC BLOOD PRESSURE: 180 MMHG | DIASTOLIC BLOOD PRESSURE: 92 MMHG

## 2021-02-12 VITALS — SYSTOLIC BLOOD PRESSURE: 174 MMHG | DIASTOLIC BLOOD PRESSURE: 104 MMHG

## 2021-02-12 VITALS — DIASTOLIC BLOOD PRESSURE: 107 MMHG | SYSTOLIC BLOOD PRESSURE: 166 MMHG

## 2021-02-12 VITALS — DIASTOLIC BLOOD PRESSURE: 108 MMHG | SYSTOLIC BLOOD PRESSURE: 161 MMHG

## 2021-02-12 VITALS — SYSTOLIC BLOOD PRESSURE: 158 MMHG | DIASTOLIC BLOOD PRESSURE: 113 MMHG

## 2021-02-12 VITALS — SYSTOLIC BLOOD PRESSURE: 140 MMHG | DIASTOLIC BLOOD PRESSURE: 65 MMHG

## 2021-02-12 VITALS — DIASTOLIC BLOOD PRESSURE: 127 MMHG | SYSTOLIC BLOOD PRESSURE: 178 MMHG

## 2021-02-12 VITALS — DIASTOLIC BLOOD PRESSURE: 116 MMHG | SYSTOLIC BLOOD PRESSURE: 160 MMHG

## 2021-02-12 VITALS — DIASTOLIC BLOOD PRESSURE: 112 MMHG | SYSTOLIC BLOOD PRESSURE: 189 MMHG

## 2021-02-12 VITALS — DIASTOLIC BLOOD PRESSURE: 100 MMHG | SYSTOLIC BLOOD PRESSURE: 170 MMHG

## 2021-02-12 VITALS — SYSTOLIC BLOOD PRESSURE: 175 MMHG

## 2021-02-12 VITALS — SYSTOLIC BLOOD PRESSURE: 179 MMHG | DIASTOLIC BLOOD PRESSURE: 101 MMHG

## 2021-02-12 VITALS — DIASTOLIC BLOOD PRESSURE: 113 MMHG | SYSTOLIC BLOOD PRESSURE: 169 MMHG

## 2021-02-12 VITALS — SYSTOLIC BLOOD PRESSURE: 165 MMHG | DIASTOLIC BLOOD PRESSURE: 106 MMHG

## 2021-02-12 VITALS — SYSTOLIC BLOOD PRESSURE: 150 MMHG | DIASTOLIC BLOOD PRESSURE: 100 MMHG

## 2021-02-12 LAB
BASOPHILS # BLD AUTO: 0 /CMM (ref 0–0.2)
BASOPHILS NFR BLD AUTO: 0 % (ref 0–2)
BUN SERPL-MCNC: 14 MG/DL (ref 7–18)
CALCIUM SERPL-MCNC: 7.5 MG/DL (ref 8.5–10.1)
CHLORIDE SERPL-SCNC: 107 MMOL/L (ref 98–107)
CO2 SERPL-SCNC: 26 MMOL/L (ref 21–32)
CREAT SERPL-MCNC: 0.9 MG/DL (ref 0.6–1.3)
EOSINOPHIL NFR BLD AUTO: 0 % (ref 0–6)
GLUCOSE SERPL-MCNC: 145 MG/DL (ref 74–106)
HCT VFR BLD AUTO: 35 % (ref 39–51)
HGB BLD-MCNC: 11.7 G/DL (ref 13.5–17.5)
LYMPHOCYTES NFR BLD AUTO: 0.3 /CMM (ref 0.8–4.8)
LYMPHOCYTES NFR BLD AUTO: 7.7 % (ref 20–44)
MCHC RBC AUTO-ENTMCNC: 33 G/DL (ref 31–36)
MCV RBC AUTO: 102 FL (ref 80–96)
MONOCYTES NFR BLD AUTO: 0.3 /CMM (ref 0.1–1.3)
MONOCYTES NFR BLD AUTO: 7.3 % (ref 2–12)
NEUTROPHILS # BLD AUTO: 3.5 /CMM (ref 1.8–8.9)
NEUTROPHILS NFR BLD AUTO: 85 % (ref 43–81)
PLATELET # BLD AUTO: 116 /CMM (ref 150–450)
POTASSIUM SERPL-SCNC: 4.1 MMOL/L (ref 3.5–5.1)
RBC # BLD AUTO: 3.45 MIL/UL (ref 4.5–6)
SODIUM SERPL-SCNC: 140 MMOL/L (ref 136–145)
WBC NRBC COR # BLD AUTO: 4.1 K/UL (ref 4.3–11)

## 2021-02-12 RX ADMIN — INSULIN HUMAN PRN UNIT: 100 INJECTION, SOLUTION PARENTERAL at 21:41

## 2021-02-12 RX ADMIN — FAMOTIDINE SCH MG: 10 INJECTION INTRAVENOUS at 09:00

## 2021-02-12 RX ADMIN — AMOXICILLIN SCH MG: 250 CAPSULE ORAL at 20:33

## 2021-02-12 RX ADMIN — SODIUM CHLORIDE, SODIUM LACTATE, POTASSIUM CHLORIDE, AND CALCIUM CHLORIDE SCH MLS/HR: .6; .31; .03; .02 INJECTION, SOLUTION INTRAVENOUS at 04:28

## 2021-02-12 RX ADMIN — SODIUM CHLORIDE, SODIUM LACTATE, POTASSIUM CHLORIDE, AND CALCIUM CHLORIDE SCH MLS/HR: .6; .31; .03; .02 INJECTION, SOLUTION INTRAVENOUS at 19:19

## 2021-02-12 RX ADMIN — Medication SCH EACH: at 07:30

## 2021-02-12 RX ADMIN — INSULIN HUMAN PRN UNIT: 100 INJECTION, SOLUTION PARENTERAL at 11:42

## 2021-02-12 RX ADMIN — ENALAPRILAT PRN MG: 1.25 INJECTION, SOLUTION INTRAVENOUS at 06:18

## 2021-02-12 RX ADMIN — NITROGLYCERIN SCH GM: 20 OINTMENT TOPICAL at 12:03

## 2021-02-12 RX ADMIN — LORAZEPAM PRN MG: 2 INJECTION INTRAMUSCULAR; INTRAVENOUS at 09:37

## 2021-02-12 RX ADMIN — NITROGLYCERIN SCH GM: 20 OINTMENT TOPICAL at 23:37

## 2021-02-12 RX ADMIN — ENALAPRILAT PRN MG: 1.25 INJECTION, SOLUTION INTRAVENOUS at 00:09

## 2021-02-12 RX ADMIN — Medication SCH EACH: at 21:41

## 2021-02-12 RX ADMIN — FAMOTIDINE SCH MG: 10 INJECTION INTRAVENOUS at 20:32

## 2021-02-12 RX ADMIN — Medication SCH EACH: at 17:38

## 2021-02-12 RX ADMIN — Medication SCH EACH: at 11:43

## 2021-02-12 RX ADMIN — SODIUM CHLORIDE, SODIUM LACTATE, POTASSIUM CHLORIDE, AND CALCIUM CHLORIDE SCH MLS/HR: .6; .31; .03; .02 INJECTION, SOLUTION INTRAVENOUS at 11:42

## 2021-02-12 RX ADMIN — NITROGLYCERIN SCH GM: 20 OINTMENT TOPICAL at 06:18

## 2021-02-12 RX ADMIN — PIPERACILLIN SODIUM AND TAZOBACTAM SODIUM SCH MLS/HR: .375; 3 INJECTION, POWDER, LYOPHILIZED, FOR SOLUTION INTRAVENOUS at 09:01

## 2021-02-12 RX ADMIN — Medication SCH MG: at 09:00

## 2021-02-12 RX ADMIN — PIPERACILLIN SODIUM AND TAZOBACTAM SODIUM SCH MLS/HR: .375; 3 INJECTION, POWDER, LYOPHILIZED, FOR SOLUTION INTRAVENOUS at 00:09

## 2021-02-12 RX ADMIN — CHLORDIAZEPOXIDE HYDROCHLORIDE SCH MG: 25 CAPSULE ORAL at 17:37

## 2021-02-12 RX ADMIN — NITROGLYCERIN SCH GM: 20 OINTMENT TOPICAL at 17:38

## 2021-02-12 RX ADMIN — CHLORDIAZEPOXIDE HYDROCHLORIDE SCH MG: 25 CAPSULE ORAL at 09:00

## 2021-02-12 NOTE — NUR
TELE RN NOTES

RECEIVED PATIENT FROM ICU , REPORT GIVEN BY MISA RN,  ALERT ORIENTED X 3. NO ACUTE 
DISTRESS NOTED. BREATHING UNLABORED. IV ACCESS PATENT AND INTACT, NO REDNESS, NO SWELLING 
NOTED. SAFETY MEASURES IN PLACE. CALL LIGHT WITHIN REACH. WILL CONTINUE TO MONITOR 
ACCORDINGLY.

## 2021-02-12 NOTE — NUR
TELE RN NOTES

PATIENT IN BED ALERT ORIENTED X 3. SITTER AT BEDSIDE. NO ACUTE DISTRESS NOTED. BREATHING 
UNLABORED. IV ACCESS PATENT AND INTACT, NO REDNESS, NO SWELLING NOTED. NEEDS ATTENDED AND 
ANTICIPATED .SAFETY MEASURES IN PLACE. CALL LIGHT WITHIN REACH. WILL ENDORSE TO NIGHT NURSE 
FOR CONTINUITY OF CARE.

## 2021-02-12 NOTE — NUR
icu rn. pt awake, alert. follow commands.  am care. oral care, bed bath given. remaining 
same oxygen tolerated well. sat 96%. no acute distress noted, cardiac monitor showing s 
becky. hob elevated, fc patent. urine draining. afebrile. will continue to monitor vitals.

## 2021-02-12 NOTE — NUR
RN NOTES



SEEN AND EXAMINED BY DR. ROMERO, ORDER TO DC LABETOLOL PO, ORDER HYDRALAZINE 100MG PO TID, 
ORDERS MADE AND CARRIED OUT. WILL CONTINUE TO MONITOR.

## 2021-02-12 NOTE — NUR
RN NOTES:

RECEIVED AWAKE ON BED, ON SEMI FOWLERS POSITION, A/0X1-2, ABLE TO VERBALIZED NEEDS, ORIENTED 
TO UNIT AND STAFF, ON TELE MONITOR SR-61, ON GILLIAM CATH RAINING INTO YELLOWISH COLORED 
URINE, JYOTI-MIDLINE WITH IVF OF D5 1/2 NS  ML/HR CONSUMED AND REPLACED WITH NEW 
BOTTLE,NO SIGN OR RESPIRATORY DISCOMFORT ON O2 AT 2-3 L MIN VIA NC, FALL,SAFETY AND 
ASPIRATION PRECAUTION OBSERVED,KEPT CALL LIGHT WITHIN EASY REACH, SIDE RAILS UPX2, HE HAS 
1;1 SITTER AT BED SIDE.

## 2021-02-12 NOTE — NUR
RN NOTES:

RECEIVED AWAKE,LYING COMFORTABLY IN BED, A/OX2-3 Georgian SPEAKING BUT ABLE TO VERBALIZED 
NEEDS IN ENGLISH, ORIENTED TO UNIT AND STAFF,KEPT CALL LIGHT WITHIN EASY REACH. PATIENT IS 
CONTINENT/INCONTINENT IN B/B. SKIN IS INTACT UNABLE TO AMBULATE WITHOUT ASSISTANCE. 
FALL,SAFETY AND ASPIRATION PRECAUTION OBSERVED, ON ROOM AIR NON LABORED BREATHING.

-PER RN ENDORSEMENT PATIENT TOOK OUT HER CANNULA AND NOT YET REINSERTED.SHE WAS STARTED ON 
STOOL SOFTENER AND HAD BM THIS MORNING-SMALL AMOUNT.

-------------------------------------------------------------------------------

Addendum: 02/12/21 at 1954 by VERONIQUE KWOK RN

-------------------------------------------------------------------------------

ADDENDUM:

DISREGARD NOTES:WRONG ENTRY OF CHARTING

## 2021-02-12 NOTE — NUR
RN NOTES



PATIENT TRANSFERRED TO ROOM 308 WITH 2 RN, NO SIGNS OF DISTRESS NOTED, BEDSIDE REPORT GIVEN 
TO ANTONINA ESPAÑA. ALL BELONGINGS AND MEDICATION TRANSFERRED WITH PATIENT.

## 2021-02-12 NOTE — NUR
RN NOTES:

BLOOD SUGAR CHECKED-112, NO INSULIN GIVEN PER SCALE, WILL CONTINUE TO MONITOR FOR SIGN OF 
HYPER/HYPOGLYCEMIA.

## 2021-02-13 VITALS — DIASTOLIC BLOOD PRESSURE: 113 MMHG | SYSTOLIC BLOOD PRESSURE: 153 MMHG

## 2021-02-13 VITALS — DIASTOLIC BLOOD PRESSURE: 100 MMHG | SYSTOLIC BLOOD PRESSURE: 155 MMHG

## 2021-02-13 VITALS — DIASTOLIC BLOOD PRESSURE: 95 MMHG | SYSTOLIC BLOOD PRESSURE: 178 MMHG

## 2021-02-13 LAB
BASOPHILS # BLD AUTO: 0 /CMM (ref 0–0.2)
BASOPHILS NFR BLD AUTO: 0 % (ref 0–2)
BUN SERPL-MCNC: 17 MG/DL (ref 7–18)
CALCIUM SERPL-MCNC: 8 MG/DL (ref 8.5–10.1)
CHLORIDE SERPL-SCNC: 104 MMOL/L (ref 98–107)
CO2 SERPL-SCNC: 25 MMOL/L (ref 21–32)
CREAT SERPL-MCNC: 0.9 MG/DL (ref 0.6–1.3)
EOSINOPHIL NFR BLD AUTO: 0.1 % (ref 0–6)
GLUCOSE SERPL-MCNC: 118 MG/DL (ref 74–106)
HCT VFR BLD AUTO: 37 % (ref 39–51)
HGB BLD-MCNC: 12.3 G/DL (ref 13.5–17.5)
LYMPHOCYTES NFR BLD AUTO: 0.6 /CMM (ref 0.8–4.8)
LYMPHOCYTES NFR BLD AUTO: 12.3 % (ref 20–44)
MCHC RBC AUTO-ENTMCNC: 33 G/DL (ref 31–36)
MCV RBC AUTO: 101 FL (ref 80–96)
MONOCYTES NFR BLD AUTO: 0.4 /CMM (ref 0.1–1.3)
MONOCYTES NFR BLD AUTO: 9.7 % (ref 2–12)
NEUTROPHILS # BLD AUTO: 3.5 /CMM (ref 1.8–8.9)
NEUTROPHILS NFR BLD AUTO: 77.9 % (ref 43–81)
PLATELET # BLD AUTO: 128 /CMM (ref 150–450)
POTASSIUM SERPL-SCNC: 4.2 MMOL/L (ref 3.5–5.1)
RBC # BLD AUTO: 3.67 MIL/UL (ref 4.5–6)
SODIUM SERPL-SCNC: 137 MMOL/L (ref 136–145)
WBC NRBC COR # BLD AUTO: 4.5 K/UL (ref 4.3–11)

## 2021-02-13 RX ADMIN — Medication SCH MG: at 08:40

## 2021-02-13 RX ADMIN — ACETAMINOPHEN PRN MG: 325 TABLET ORAL at 19:39

## 2021-02-13 RX ADMIN — AMOXICILLIN SCH MG: 250 CAPSULE ORAL at 20:45

## 2021-02-13 RX ADMIN — FAMOTIDINE SCH MG: 10 INJECTION INTRAVENOUS at 08:40

## 2021-02-13 RX ADMIN — NITROGLYCERIN SCH GM: 20 OINTMENT TOPICAL at 12:40

## 2021-02-13 RX ADMIN — Medication SCH EACH: at 12:37

## 2021-02-13 RX ADMIN — Medication SCH EACH: at 08:10

## 2021-02-13 RX ADMIN — Medication SCH EACH: at 18:10

## 2021-02-13 RX ADMIN — ACETAMINOPHEN PRN MG: 325 TABLET ORAL at 05:14

## 2021-02-13 RX ADMIN — SODIUM CHLORIDE, SODIUM LACTATE, POTASSIUM CHLORIDE, AND CALCIUM CHLORIDE SCH MLS/HR: .6; .31; .03; .02 INJECTION, SOLUTION INTRAVENOUS at 15:21

## 2021-02-13 RX ADMIN — SODIUM CHLORIDE, SODIUM LACTATE, POTASSIUM CHLORIDE, AND CALCIUM CHLORIDE SCH MLS/HR: .6; .31; .03; .02 INJECTION, SOLUTION INTRAVENOUS at 02:11

## 2021-02-13 RX ADMIN — NITROGLYCERIN SCH GM: 20 OINTMENT TOPICAL at 05:04

## 2021-02-13 RX ADMIN — NITROGLYCERIN SCH GM: 20 OINTMENT TOPICAL at 18:15

## 2021-02-13 RX ADMIN — FAMOTIDINE SCH MG: 20 TABLET, FILM COATED ORAL at 20:45

## 2021-02-13 RX ADMIN — AMOXICILLIN SCH MG: 250 CAPSULE ORAL at 08:40

## 2021-02-13 RX ADMIN — Medication SCH EACH: at 21:31

## 2021-02-13 RX ADMIN — SODIUM CHLORIDE, SODIUM LACTATE, POTASSIUM CHLORIDE, AND CALCIUM CHLORIDE SCH MLS/HR: .6; .31; .03; .02 INJECTION, SOLUTION INTRAVENOUS at 21:05

## 2021-02-13 RX ADMIN — SODIUM CHLORIDE, SODIUM LACTATE, POTASSIUM CHLORIDE, AND CALCIUM CHLORIDE SCH MLS/HR: .6; .31; .03; .02 INJECTION, SOLUTION INTRAVENOUS at 08:41

## 2021-02-13 RX ADMIN — CHLORDIAZEPOXIDE HYDROCHLORIDE SCH MG: 25 CAPSULE ORAL at 08:40

## 2021-02-13 RX ADMIN — CHLORDIAZEPOXIDE HYDROCHLORIDE SCH MG: 25 CAPSULE ORAL at 18:13

## 2021-02-13 NOTE — NUR
RN closing



Patient is in bed resting, does no appears distress or discomfort. Skin is warm to touch, 
keep clean/dry. Respiratory even and unlabored with oxygen at 4LPM. Kept elevated HOB for 
ensure air way and aspiration precaution and lowest bed for safety. Sitter by the bed, call 
light within reach, will endorse night shift

## 2021-02-13 NOTE — NUR
RN NOTES:

NO SIGN OF AGITATION, COOPERATIVE AND PLEASANT MOOD, NO SIGN OF AGRRESIVENESS OR COMBATIVE 
BEHAVIOR NOTED ON ENTIRE SHIFT.MONITORED FOR SEIZURE.

## 2021-02-13 NOTE — NUR
RN Opening note



Received patient in bed, AO x 1-2, able to responds all stimuli, Pt does no c/o pain or 
distress. Skin is warm to touch keep clean/dry intact IV site, respiratory even and 
unlabored on O2sat 100%. Kept locked bed with elevated HOB for aspiration precaution and 
ensure airway and lowest bed foe safety. Call light within reach, will continue to monitor.

## 2021-02-13 NOTE — NUR
RN NOTES:

0030 BP-155/100 DE-59 SINUS RHYTHM, NO COMPLAINTS OF CHEST PAIN OR DISCOMFORT, 
NITROGLYCERINE GIVEN AS SCHEDULED.ASLEEP, NO TREMOR OR ANY SIGN OF ALCOHOL WITHDRAWAL NOTED, 
SEIZURE AND ASPIRATION PRECAUTION OBSERVED.

## 2021-02-13 NOTE — NUR
RN NOTES:

RN NOTES:

-AT 0430 HE WAS AWAKEN TO CHECK V/S, HE WAS A BIT UPSET AND SHOUT TO THE SITTER, EXPLAINED 
TO HIM THAT HE IS STILL A TELE PATIENT AND WE NEED TO MONITOR HIS V/S, HIS BP-153/115 
INITIALLY, THEN ASK TO CALM DOWN, REPEATED AFTER FEW MIN. BP-153/110, TRIED TO PACIFY HIM, 
HYDRALAZINE 100MG, FOLLOWED BY NITRO-BID GIVEN.

-AT 0514 HE CALM DOWN HE TALK IN CALM VOICE, RN EXPLAINED TO HIM THAT WE NEED TO MONITOR HIM 
JUST LIKE IN ICU, HE SAID HE UNDERSTAND, HE ASKED FOR TYLENOL, PRN GIVEN, HE HAD A SLIGHT 
HEADACHE BECAUSE HE SUDDENLY WAKE UP.KEPT IN COMFORTABLE POSITION.

## 2021-02-13 NOTE — NUR
RN NOTES



PT RECEIVED ALERT AND ORIENTED X 4 NO RESPIRATORY DISTRESS NOTED

ON 4LPM VIA NASAL CANULA TOLERATING WELL. PT REPORTED GENERALIZED PAIN TYLENOL PROVIDED FOR 
PAIN MANAGEMENT.WILL REASSESS. ALL PT NEEDS ATTENDED TO AT THIS TIME. CALL LIGHT WITHIN 
REACH SAFETY MEASURES FOLLOWED.WILL CONTINUE TO MONITOR.

## 2021-02-13 NOTE — NUR
RN NOTES:

ON SR RATE-69, URINE OUTPUT-1,250,CONTINUE IVF, BLOOD SUGAR CHECKED-104, FOR LAB TEST THIS 
MORNING, NOTIFIED INCOMING NURSE TO REFER BP DURING DOCTORS ROUND, PER SITTER HE HAS ON AND 
OFF COUGH THIS MORNING, ENDORSED FOR CONTINUITY OF CARE.

## 2021-02-14 VITALS — DIASTOLIC BLOOD PRESSURE: 91 MMHG | SYSTOLIC BLOOD PRESSURE: 128 MMHG

## 2021-02-14 VITALS — DIASTOLIC BLOOD PRESSURE: 117 MMHG | SYSTOLIC BLOOD PRESSURE: 115 MMHG

## 2021-02-14 VITALS — SYSTOLIC BLOOD PRESSURE: 157 MMHG | DIASTOLIC BLOOD PRESSURE: 101 MMHG

## 2021-02-14 RX ADMIN — Medication SCH EACH: at 11:28

## 2021-02-14 RX ADMIN — Medication SCH EACH: at 17:42

## 2021-02-14 RX ADMIN — INSULIN HUMAN PRN UNIT: 100 INJECTION, SOLUTION PARENTERAL at 11:29

## 2021-02-14 RX ADMIN — AMOXICILLIN SCH MG: 250 CAPSULE ORAL at 08:40

## 2021-02-14 RX ADMIN — Medication SCH EACH: at 06:48

## 2021-02-14 RX ADMIN — NITROGLYCERIN SCH GM: 20 OINTMENT TOPICAL at 17:05

## 2021-02-14 RX ADMIN — CHLORDIAZEPOXIDE HYDROCHLORIDE SCH MG: 25 CAPSULE ORAL at 16:28

## 2021-02-14 RX ADMIN — NITROGLYCERIN SCH GM: 20 OINTMENT TOPICAL at 12:11

## 2021-02-14 RX ADMIN — Medication SCH MG: at 08:40

## 2021-02-14 RX ADMIN — NITROGLYCERIN SCH GM: 20 OINTMENT TOPICAL at 00:24

## 2021-02-14 RX ADMIN — SODIUM CHLORIDE, SODIUM LACTATE, POTASSIUM CHLORIDE, AND CALCIUM CHLORIDE SCH MLS/HR: .6; .31; .03; .02 INJECTION, SOLUTION INTRAVENOUS at 11:21

## 2021-02-14 RX ADMIN — FAMOTIDINE SCH MG: 20 TABLET, FILM COATED ORAL at 08:40

## 2021-02-14 RX ADMIN — Medication SCH EACH: at 21:52

## 2021-02-14 RX ADMIN — SODIUM CHLORIDE, SODIUM LACTATE, POTASSIUM CHLORIDE, AND CALCIUM CHLORIDE SCH MLS/HR: .6; .31; .03; .02 INJECTION, SOLUTION INTRAVENOUS at 03:26

## 2021-02-14 RX ADMIN — NITROGLYCERIN SCH GM: 20 OINTMENT TOPICAL at 05:38

## 2021-02-14 RX ADMIN — AMOXICILLIN SCH MG: 250 CAPSULE ORAL at 21:04

## 2021-02-14 RX ADMIN — INSULIN HUMAN PRN UNIT: 100 INJECTION, SOLUTION PARENTERAL at 17:44

## 2021-02-14 RX ADMIN — CHLORDIAZEPOXIDE HYDROCHLORIDE SCH MG: 25 CAPSULE ORAL at 08:41

## 2021-02-14 RX ADMIN — FAMOTIDINE SCH MG: 20 TABLET, FILM COATED ORAL at 21:05

## 2021-02-14 RX ADMIN — SODIUM CHLORIDE, SODIUM LACTATE, POTASSIUM CHLORIDE, AND CALCIUM CHLORIDE SCH MLS/HR: .6; .31; .03; .02 INJECTION, SOLUTION INTRAVENOUS at 17:59

## 2021-02-14 NOTE — NUR
RN NOTES



PT ALERT AND ORIENTED X 4 NO RESPIRATORY DISTRESS PT ON ROOM AIR TOLERATING WELL..WILL 
REASSESS. ALL PT NEEDS ATTENDED TO AT THIS TIME. CALL LIGHT WITHIN REACH SAFETY MEASURES 
FOLLOWED.WILL ENDORSE CARE TO DAY SHIFT NURSE.

## 2021-02-14 NOTE — NUR
RN NOTES



PT HAD PHYSICAL EVALUATION TODAY, ABLE TO WALKED TO THE TOILET WITH FWW AND ASSIST. REMINDED 
PT TO CALL FOR ASSISTANCE WHEN OOB. WILL CONTINUE TO MONITOR.

## 2021-02-14 NOTE — NUR
MS RN CLOSING NOTE



PT IS AWAKE IN BED, RESPONSIVE, ABLE TO MAKE NEEDS KNOWN AND IS A/O X 3-4. PT HAS NO C/O 
PAIN AT THIS TIME. HOB IS ELEVATED. PT IS ON ROOM AIR AND TOLERATING WELL WITH NO SOB, 
LABORED BREATHING AND RESPIRATORY DISTRESS NOTED. PT HAS IV ACCESS ON LEFT UPPER ARM PATENT, 
INTACT AND FLUSHING WELL WITH NO REDNESS, SWELLING, INFILTRATION OR IRRITATION NOTED. SAFETY 
MEASURES IN PLACE: BED IN LOWEST POSITION AND LOCKED WITH UPPER SIDE RAILS UP X 2. CALL 
LIGHT PLACED WITHIN REACH. WILL ENDORSE TO NIGHT SHIFT NURSE.

## 2021-02-14 NOTE — NUR
RN NOTES



PT IS AWAKE IN BED, RESPONSIVE, ABLE TO MAKE NEEDS KNOWN AND IS A/O X 3-4. PT HAS NO C/O 
PAIN AT THIS TIME. HOB IS ELEVATED. PT IS ON ROOM AIR AND TOLERATING WELL WITH NO SOB, 
LABORED BREATHING AND RESPIRATORY DISTRESS NOTED. PT HAS IV ACCESS ON LEFT UPPER ARM PATENT, 
INTACT AND FLUSHING WELL WITH NO REDNESS, SWELLING, INFILTRATION OR IRRITATION NOTED. SAFETY 
MEASURES IN PLACE: BED IN LOWEST POSITION AND LOCKED WITH UPPER SIDE RAILS UP X 2. CALL 
LIGHT PLACED WITHIN REACH.WILL CONTINUE TO MONITOR.

## 2021-02-14 NOTE — NUR
RN NOTES



PT EXPRESSED HAVING INDIGESTION NURSE PROVIDED MEDICATION FOR INDIGESTION.WILL CONTINUE TO 
MONITOR.

## 2021-02-14 NOTE — NUR
MS RN OPENING NOTES



PT RECEIVED AWAKE, A/O X 2-3 WITH SLIGHT CONFUSION. PT IS VERBAL AND ABLE TO VERBALIZE 
NEEDS. PT ON ROOM AIR WITH NO SOB OR LABORED BREATHING NOTED. PT HAS IVF OF POTASSIUM 
CHLORIDE WITH D5 1/2 NS HELD BY NIGHT SHIFT NURSE AT THIS TIME BECAUSE OF ELEVATED BP. PT 
HAS IV ACCESS ON LEFT UPPER ARM WITH NO REDNESS, SWELLING OR INFILTRATION NOTED AT SITE. PT 
COMPLAINED OF CHEST PAIN, BUT TOLERABLE AT THIS TIME. SAFETY MEASURES IN PLACE: BED PLACED 
IN LOWEST, LOCKED POSITION. CALL LIGHT PLACED WITHIN REACH. WILL CONTINUE TO MONITOR.

## 2021-02-15 VITALS — SYSTOLIC BLOOD PRESSURE: 125 MMHG | DIASTOLIC BLOOD PRESSURE: 64 MMHG

## 2021-02-15 VITALS — SYSTOLIC BLOOD PRESSURE: 143 MMHG | DIASTOLIC BLOOD PRESSURE: 93 MMHG

## 2021-02-15 LAB
BUN SERPL-MCNC: 18 MG/DL (ref 7–18)
CALCIUM SERPL-MCNC: 8.4 MG/DL (ref 8.5–10.1)
CHLORIDE SERPL-SCNC: 103 MMOL/L (ref 98–107)
CO2 SERPL-SCNC: 30 MMOL/L (ref 21–32)
CREAT SERPL-MCNC: 0.9 MG/DL (ref 0.6–1.3)
GLUCOSE SERPL-MCNC: 106 MG/DL (ref 74–106)
POTASSIUM SERPL-SCNC: 3.6 MMOL/L (ref 3.5–5.1)
SODIUM SERPL-SCNC: 138 MMOL/L (ref 136–145)

## 2021-02-15 RX ADMIN — CHLORDIAZEPOXIDE HYDROCHLORIDE SCH MG: 25 CAPSULE ORAL at 17:00

## 2021-02-15 RX ADMIN — INSULIN HUMAN PRN UNIT: 100 INJECTION, SOLUTION PARENTERAL at 11:38

## 2021-02-15 RX ADMIN — NITROGLYCERIN SCH GM: 20 OINTMENT TOPICAL at 05:33

## 2021-02-15 RX ADMIN — Medication SCH MG: at 08:42

## 2021-02-15 RX ADMIN — Medication SCH EACH: at 17:24

## 2021-02-15 RX ADMIN — NITROGLYCERIN SCH GM: 20 OINTMENT TOPICAL at 00:14

## 2021-02-15 RX ADMIN — FAMOTIDINE SCH MG: 20 TABLET, FILM COATED ORAL at 08:41

## 2021-02-15 RX ADMIN — CHLORDIAZEPOXIDE HYDROCHLORIDE SCH MG: 25 CAPSULE ORAL at 08:41

## 2021-02-15 RX ADMIN — Medication SCH EACH: at 06:56

## 2021-02-15 RX ADMIN — CLONIDINE SCH EA: 0.3 PATCH TRANSDERMAL at 16:30

## 2021-02-15 RX ADMIN — NITROGLYCERIN SCH GM: 20 OINTMENT TOPICAL at 12:48

## 2021-02-15 RX ADMIN — SODIUM CHLORIDE, SODIUM LACTATE, POTASSIUM CHLORIDE, AND CALCIUM CHLORIDE SCH MLS/HR: .6; .31; .03; .02 INJECTION, SOLUTION INTRAVENOUS at 06:57

## 2021-02-15 RX ADMIN — SODIUM CHLORIDE, SODIUM LACTATE, POTASSIUM CHLORIDE, AND CALCIUM CHLORIDE SCH MLS/HR: .6; .31; .03; .02 INJECTION, SOLUTION INTRAVENOUS at 14:03

## 2021-02-15 RX ADMIN — SODIUM CHLORIDE, SODIUM LACTATE, POTASSIUM CHLORIDE, AND CALCIUM CHLORIDE SCH MLS/HR: .6; .31; .03; .02 INJECTION, SOLUTION INTRAVENOUS at 00:16

## 2021-02-15 RX ADMIN — Medication SCH EACH: at 11:37

## 2021-02-15 RX ADMIN — AMOXICILLIN SCH MG: 250 CAPSULE ORAL at 08:41

## 2021-02-15 NOTE — NUR
Consult:



SW consult requested by ICU staff for a 60year old  homeless  male. SW met with pt 
at ICU room 308/1 and conducted an assessment at 1110 am.  Pt made good eye contact during 
the assessment.  Pt. alert and oriented x4 ( time, place, self, and situation). Pt appears 
to be in an anxious mood with distressed affect. Pt.'s speech is normal limits without 
limitations . Patient states he is seeking medical attention for ETOH abuse. Pt. denies 
suicidal and homicidal ideation. Pt denies any visual or auditory hallucinations as well. 
Pt. states he has hx of seizures. Pt states drinking for about 40 years. Pt states his 
preference of alcohol is liquor (vodka) and drinks 4 pints a day.  Pt. appear groomed  and 
has appropriate clothing (medical gown). Pt. appears to be ambulatory with a steady gait. Pt 
phone number ([phone]) Pt is currently unemployed and receives GR monthly ($221), and 
food stamps ($189 monthly).   Pt agrees to having an addiction issue with ETOH and is the 
reason for being admitted to the hospital. Pt has a plan once being medically discharged to 
return back to [facility] ([address] [place] [address],  [phone]). Pt 
also gave additional number ([phone]-Sheyla -representative(Ext [phone]). Pt states has 
been staying there before admitting to the hospital and will return back to the facility. Pt 
ambulates with steady gait and no DME. SW offer resources with [phone] winter shelter 
packet and addiction resources. Pt is responsive to the resources given.





Mental Health resources provided: [facility] [address] [place] [address] [phone]; [facility] [address] [facility] [address], 
[place] [address] [phone]; [facility] 
[address] [place] [address] [phone]; [facility] [address] 
[place], [place] [id number] [phone]. Substance Abuse resources provided 
included: [facility] Substance Abuse Self-Help line ([facility]) [phone] [phone]; [facility] 
[address] [facility] [place] [address] [phone]; [facility] [address] 
[facility] [address] [phone]; [facility] [address] 
[place] [address] [phone]; [facility] [address] [facility] [address] [phone];  [facility] [address] [place] [facility] [address] [phone]; [facility] [address] [facility] [id number] [phone]; [facility] Substance Abuse Help line ([facility] [phone]; [facility] 
[facility]  [phone]; [facility] [phone] [facility]  
[phone] [place]; [facility]  [phone] [phone] [place]; [facility] [phone] [place]; [facility]  [phone] [place]; 
[place] [phone] [place]; [facility] [phone] [place]; [facility] [phone] [place]; Alcoholics Anonymous [phone] 621-2855-SFV; 
[facility] [phone]; [facility] [phone] 066-2291-SFV; Narcotics Anonymous 
www.na.org. Pt is receptive towards resources and list given by the  during the 
assessment. Pt sign homeless waiver form and placed in pt's chart. 



Plan: Pt. was given the appropriate resources with the substance abuse packet and the 
homeless [phone] winter shelter packet. Pt will return to  [facility] ([address], [place] [address],  [phone]). Pt also gave additional number 
([phone]-Sheyla -representative(Ext [phone]).

## 2021-02-15 NOTE — NUR
RN NOTES







BLOOD SUGAR CHECKED NO INSULIN COVERAGE NEEDED. WILL ENDORSE CARE TO DAY SHIFT NURSE.

## 2021-02-15 NOTE — NUR
MS RN OPENING NOTES



PT RECEIVED AWAKE, RESPONSIVE, A/O X 3-4. PT IS WATCHING TV, IS VERBAL AND ABLE TO VERBALIZE 
NEEDS. PT HAS NO C/O PAIN AT THIS TIME. PT ON ROOM AIR WITH NO SOB OR LABORED BREATHING 
NOTED. PT HAS IV ACCESS ON LEFT UPPER ARM, PATENT, INTACT AND FLUSHES WELL WITH NO REDNESS, 
SWELLING. IRRITATION OR INFILTRATION NOTED AT SITE. PT VERBALIZES EAGERNESS TO RECOVER AND 
GO HOME. SAFETY MEASURES IN PLACE: BED PLACED IN LOWEST, LOCKED POSITION. BOTH UPPER SIDE 
RAILS X 2 ARE UP. CALL LIGHT PLACED WITHIN REACH. WILL CONTINUE TO MONITOR.

## 2021-02-15 NOTE — NUR
RN DISCHARGED NOTES



PATIENT DISCHARGED HOME IN STABLE CONDITION. HE'S A/O X4. ABLE TO MAKE NEEDS KNOWN. ALL 
NEEDS AND CARE ATTENDED WELL. V/S TAKEN, STABLE AND RECORDED. HOMELESS WAIVER FORM SIGNED BY 
PT. ALL BELONGINGS ACCOUNTED FOR AND SIGNED FORM. NO SKIN ISSUES NOTED. MIDLINE ON JYOTI 
REMOVED WITH NO ACTIVE BLEEDING NOTED, DRY PRESSURE DRESSING APPLIED TO SITE. NAME ARMBAND 
REMOVED. HOMELESS  RESOURCES  GIVEN BY SW TO PT BUT PT VERBALIZED THAT HE WILL GO BACK TO 
[facility] [address] [facility] [place] [address],  [phone].  ALCOHOL 
CESSATION EDUCATION AND DISCHARGE INSTRUCTIONS GIVEN TO PT AND VERBALIZED UNDERSTANDING. PT 
LEFT UNIT AT 1725 AMBULATORY WITH STEADY GAIT ACCOMPANIED BY ME TO LOBBY. TAP CARD GIVEN TO 
PT. CHARGE NURSE LACEY AWARE OF DISCHARGE.

## 2021-02-15 NOTE — NUR
RN NOTES



GILLIAM CATHETER REMOVED, NO HEMATURIA NOTED AND NO C/O PAIN VOICED BY PT. WILL MONITOR PT FOR 
VOIDING.

## 2021-03-11 ENCOUNTER — HOSPITAL ENCOUNTER (EMERGENCY)
Dept: HOSPITAL 54 - ER | Age: 61
Discharge: HOME | End: 2021-03-11
Payer: COMMERCIAL

## 2021-03-11 VITALS — SYSTOLIC BLOOD PRESSURE: 144 MMHG | DIASTOLIC BLOOD PRESSURE: 90 MMHG

## 2021-03-11 VITALS — WEIGHT: 218 LBS | HEIGHT: 72 IN | BODY MASS INDEX: 29.53 KG/M2

## 2021-03-11 DIAGNOSIS — F10.239: Primary | ICD-10-CM

## 2021-03-11 DIAGNOSIS — I10: ICD-10-CM

## 2021-03-11 DIAGNOSIS — F32.9: ICD-10-CM
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
## 2021-03-11 DIAGNOSIS — R07.89: ICD-10-CM

## 2021-03-11 DIAGNOSIS — Z98.890: ICD-10-CM

## 2021-03-11 DIAGNOSIS — Z79.899: ICD-10-CM

## 2021-03-11 LAB
BASOPHILS # BLD AUTO: 0.1 /CMM (ref 0–0.2)
BASOPHILS NFR BLD AUTO: 1.3 % (ref 0–2)
BUN SERPL-MCNC: 11 MG/DL (ref 7–18)
CALCIUM SERPL-MCNC: 9 MG/DL (ref 8.5–10.1)
CHLORIDE SERPL-SCNC: 101 MMOL/L (ref 98–107)
CO2 SERPL-SCNC: 31 MMOL/L (ref 21–32)
CREAT SERPL-MCNC: 1.2 MG/DL (ref 0.6–1.3)
EOSINOPHIL NFR BLD AUTO: 0.2 % (ref 0–6)
ETHANOL SERPL-MCNC: < 3 MG/DL (ref 0–0)
GLUCOSE SERPL-MCNC: 106 MG/DL (ref 74–106)
HCT VFR BLD AUTO: 44 % (ref 39–51)
HGB BLD-MCNC: 14.9 G/DL (ref 13.5–17.5)
LYMPHOCYTES NFR BLD AUTO: 1.8 /CMM (ref 0.8–4.8)
LYMPHOCYTES NFR BLD AUTO: 29.6 % (ref 20–44)
MCHC RBC AUTO-ENTMCNC: 34 G/DL (ref 31–36)
MCV RBC AUTO: 99 FL (ref 80–96)
MONOCYTES NFR BLD AUTO: 0.4 /CMM (ref 0.1–1.3)
MONOCYTES NFR BLD AUTO: 7.2 % (ref 2–12)
NEUTROPHILS # BLD AUTO: 3.8 /CMM (ref 1.8–8.9)
NEUTROPHILS NFR BLD AUTO: 61.7 % (ref 43–81)
PLATELET # BLD AUTO: 265 /CMM (ref 150–450)
POTASSIUM SERPL-SCNC: 4 MMOL/L (ref 3.5–5.1)
RBC # BLD AUTO: 4.48 MIL/UL (ref 4.5–6)
SODIUM SERPL-SCNC: 141 MMOL/L (ref 136–145)
WBC NRBC COR # BLD AUTO: 6.2 K/UL (ref 4.3–11)

## 2021-03-11 PROCEDURE — 80320 DRUG SCREEN QUANTALCOHOLS: CPT

## 2021-03-11 PROCEDURE — 80048 BASIC METABOLIC PNL TOTAL CA: CPT

## 2021-03-11 PROCEDURE — 96375 TX/PRO/DX INJ NEW DRUG ADDON: CPT

## 2021-03-11 PROCEDURE — 96374 THER/PROPH/DIAG INJ IV PUSH: CPT

## 2021-03-11 PROCEDURE — 85025 COMPLETE CBC W/AUTO DIFF WBC: CPT

## 2021-03-11 PROCEDURE — 71045 X-RAY EXAM CHEST 1 VIEW: CPT

## 2021-03-11 PROCEDURE — 84484 ASSAY OF TROPONIN QUANT: CPT

## 2021-03-11 PROCEDURE — 85730 THROMBOPLASTIN TIME PARTIAL: CPT

## 2021-03-11 PROCEDURE — 36415 COLL VENOUS BLD VENIPUNCTURE: CPT

## 2021-03-11 PROCEDURE — C9113 INJ PANTOPRAZOLE SODIUM, VIA: HCPCS

## 2021-03-11 PROCEDURE — 99285 EMERGENCY DEPT VISIT HI MDM: CPT

## 2021-03-11 PROCEDURE — 93005 ELECTROCARDIOGRAM TRACING: CPT

## 2021-03-11 PROCEDURE — G0480 DRUG TEST DEF 1-7 CLASSES: HCPCS

## 2021-03-11 NOTE — NUR
60 y/m with self reported history of HTN, vertigo, seizure and chronic alcohol 
use, presents to the Ed with a c/o of dizziness and intermittent chest pain 
8/10 x 1 day. Pt reports drinking 6 packs of beer yesterday. pt reports his 
chest pain has been happening for a very long time, and it does not radiates to 
anywhere.  Pt noted with mild upper extremities tremors and unsteady gait. Pt 
denies fall/injury, N/V/SOB and headache. Pt place in bed, cardiac and cont 
pulse oximetry in place, 18 G IV inserted, all labs collected and sent. Safety 
precaution and seizure precaution in place and will continue to monitor and 
interven as per MD recommendation.

## 2021-05-26 ENCOUNTER — HOSPITAL ENCOUNTER (EMERGENCY)
Dept: HOSPITAL 54 - ER | Age: 61
Discharge: HOME | End: 2021-05-26
Payer: COMMERCIAL

## 2021-05-26 VITALS — SYSTOLIC BLOOD PRESSURE: 126 MMHG | DIASTOLIC BLOOD PRESSURE: 87 MMHG

## 2021-05-26 VITALS — WEIGHT: 220 LBS | BODY MASS INDEX: 29.8 KG/M2 | HEIGHT: 72 IN
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
## 2021-05-26 DIAGNOSIS — I10: ICD-10-CM

## 2021-05-26 DIAGNOSIS — Z79.899: ICD-10-CM

## 2021-05-26 DIAGNOSIS — F32.9: ICD-10-CM

## 2021-05-26 DIAGNOSIS — F10.10: ICD-10-CM

## 2021-05-26 DIAGNOSIS — R07.89: Primary | ICD-10-CM

## 2021-05-26 DIAGNOSIS — Z98.890: ICD-10-CM

## 2021-05-26 LAB
BASOPHILS # BLD AUTO: 0 /CMM (ref 0–0.2)
BASOPHILS NFR BLD AUTO: 1.1 % (ref 0–2)
BUN SERPL-MCNC: 18 MG/DL (ref 7–18)
CALCIUM SERPL-MCNC: 9.1 MG/DL (ref 8.5–10.1)
CHLORIDE SERPL-SCNC: 103 MMOL/L (ref 98–107)
CO2 SERPL-SCNC: 29 MMOL/L (ref 21–32)
CREAT SERPL-MCNC: 1.1 MG/DL (ref 0.6–1.3)
EOSINOPHIL NFR BLD AUTO: 3.4 % (ref 0–6)
GLUCOSE SERPL-MCNC: 90 MG/DL (ref 74–106)
HCT VFR BLD AUTO: 40 % (ref 39–51)
HGB BLD-MCNC: 13.3 G/DL (ref 13.5–17.5)
LYMPHOCYTES NFR BLD AUTO: 1.5 /CMM (ref 0.8–4.8)
LYMPHOCYTES NFR BLD AUTO: 32.5 % (ref 20–44)
MCHC RBC AUTO-ENTMCNC: 33 G/DL (ref 31–36)
MCV RBC AUTO: 97 FL (ref 80–96)
MONOCYTES NFR BLD AUTO: 0.4 /CMM (ref 0.1–1.3)
MONOCYTES NFR BLD AUTO: 9.3 % (ref 2–12)
NEUTROPHILS # BLD AUTO: 2.5 /CMM (ref 1.8–8.9)
NEUTROPHILS NFR BLD AUTO: 53.7 % (ref 43–81)
PLATELET # BLD AUTO: 215 /CMM (ref 150–450)
POTASSIUM SERPL-SCNC: 4.7 MMOL/L (ref 3.5–5.1)
RBC # BLD AUTO: 4.08 MIL/UL (ref 4.5–6)
SODIUM SERPL-SCNC: 139 MMOL/L (ref 136–145)
WBC NRBC COR # BLD AUTO: 4.6 K/UL (ref 4.3–11)

## 2021-05-26 NOTE — NUR
PT is medically stable for d/c. IV removed. Catheter intact and site benign. 
Pressure and 4x4 applied to site. No bleeding noted.Patient discharged to home 
in stable condition. Written and verbal after care instructions given. Patient 
verbalizes understanding of instruction.

## 2021-05-26 NOTE — NUR
LAURA FROM [facility] FOR MID STERNAL, NON RADIATING  CP 8/10 SINCE 
2000

. PER PT HE HE'S ALWAYS SHORT OF BREATH. PT ALSO W/ C/O L AHND NUMBNESS. REC'D 
NITRO SPRAY X1 W/ NO RELIEF. PER PT AND EMS PT W/ HX OF "STOMACH ULCER" HENCE 
ASA WAS NOT GIVEN ON THE FIELD. PT WAS PLACED IN BED 9 ER , ON MONITOR . VSS. 
WILL CONT TO MONITOR

## 2021-08-17 ENCOUNTER — HOSPITAL ENCOUNTER (EMERGENCY)
Dept: HOSPITAL 54 - ER | Age: 61
Discharge: HOME | End: 2021-08-17
Payer: COMMERCIAL

## 2021-08-17 VITALS — HEIGHT: 72 IN | WEIGHT: 221 LBS | BODY MASS INDEX: 29.93 KG/M2

## 2021-08-17 VITALS — DIASTOLIC BLOOD PRESSURE: 85 MMHG | SYSTOLIC BLOOD PRESSURE: 121 MMHG

## 2021-08-17 DIAGNOSIS — I10: ICD-10-CM

## 2021-08-17 DIAGNOSIS — Z79.899: ICD-10-CM

## 2021-08-17 DIAGNOSIS — M54.5: Primary | ICD-10-CM

## 2021-08-17 DIAGNOSIS — F32.9: ICD-10-CM

## 2021-08-17 DIAGNOSIS — Z98.890: ICD-10-CM

## 2021-08-17 PROCEDURE — 99283 EMERGENCY DEPT VISIT LOW MDM: CPT

## 2021-08-17 PROCEDURE — 96372 THER/PROPH/DIAG INJ SC/IM: CPT

## 2021-08-27 ENCOUNTER — HOSPITAL ENCOUNTER (EMERGENCY)
Dept: HOSPITAL 54 - ER | Age: 61
Discharge: HOME | End: 2021-08-27
Payer: COMMERCIAL

## 2021-08-27 VITALS — SYSTOLIC BLOOD PRESSURE: 118 MMHG | DIASTOLIC BLOOD PRESSURE: 81 MMHG

## 2021-08-27 VITALS — HEIGHT: 72 IN | BODY MASS INDEX: 29.8 KG/M2 | WEIGHT: 220 LBS

## 2021-08-27 DIAGNOSIS — F32.9: ICD-10-CM

## 2021-08-27 DIAGNOSIS — K64.9: Primary | ICD-10-CM

## 2021-08-27 DIAGNOSIS — I10: ICD-10-CM

## 2021-08-27 DIAGNOSIS — Z79.899: ICD-10-CM

## 2021-08-27 DIAGNOSIS — R22.43: ICD-10-CM

## 2021-08-27 DIAGNOSIS — Z98.890: ICD-10-CM

## 2021-08-27 LAB
BASOPHILS # BLD AUTO: 0 K/UL (ref 0–0.2)
BASOPHILS NFR BLD AUTO: 0.9 % (ref 0–2)
BUN SERPL-MCNC: 26 MG/DL (ref 7–18)
CALCIUM SERPL-MCNC: 8.8 MG/DL (ref 8.5–10.1)
CHLORIDE SERPL-SCNC: 103 MMOL/L (ref 98–107)
CO2 SERPL-SCNC: 28 MMOL/L (ref 21–32)
CREAT SERPL-MCNC: 1.4 MG/DL (ref 0.6–1.3)
EOSINOPHIL NFR BLD AUTO: 6.6 % (ref 0–6)
GLUCOSE SERPL-MCNC: 77 MG/DL (ref 74–106)
HCT VFR BLD AUTO: 37 % (ref 39–51)
HGB BLD-MCNC: 12.7 G/DL (ref 13.5–17.5)
LYMPHOCYTES NFR BLD AUTO: 1.5 K/UL (ref 0.8–4.8)
LYMPHOCYTES NFR BLD AUTO: 27.5 % (ref 20–44)
MCHC RBC AUTO-ENTMCNC: 34 G/DL (ref 31–36)
MCV RBC AUTO: 97 FL (ref 80–96)
MONOCYTES NFR BLD AUTO: 0.5 K/UL (ref 0.1–1.3)
MONOCYTES NFR BLD AUTO: 8.8 % (ref 2–12)
NEUTROPHILS # BLD AUTO: 3.1 K/UL (ref 1.8–8.9)
NEUTROPHILS NFR BLD AUTO: 56.2 % (ref 43–81)
PLATELET # BLD AUTO: 226 K/UL (ref 150–450)
POTASSIUM SERPL-SCNC: 4.6 MMOL/L (ref 3.5–5.1)
RBC # BLD AUTO: 3.82 MIL/UL (ref 4.5–6)
SODIUM SERPL-SCNC: 140 MMOL/L (ref 136–145)
WBC NRBC COR # BLD AUTO: 5.5 K/UL (ref 4.3–11)

## 2021-08-27 NOTE — NUR
patient came in to the er c/o noticed blood in the stool, and concern blood 
clot on BLE. On room air, breathing evenyl and unlabored. Connected to the 
monitor and pulse ox. kept comfortable. will continue to monitor accordingly.

## 2021-10-21 ENCOUNTER — HOSPITAL ENCOUNTER (EMERGENCY)
Dept: HOSPITAL 54 - ER | Age: 61
Discharge: HOME | End: 2021-10-21
Payer: COMMERCIAL

## 2021-10-21 VITALS — HEIGHT: 72 IN | BODY MASS INDEX: 29.8 KG/M2 | WEIGHT: 220 LBS

## 2021-10-21 VITALS — DIASTOLIC BLOOD PRESSURE: 73 MMHG | SYSTOLIC BLOOD PRESSURE: 129 MMHG

## 2021-10-21 DIAGNOSIS — Z79.899: ICD-10-CM

## 2021-10-21 DIAGNOSIS — I10: ICD-10-CM

## 2021-10-21 DIAGNOSIS — F32.9: ICD-10-CM

## 2021-10-21 DIAGNOSIS — G40.909: ICD-10-CM

## 2021-10-21 DIAGNOSIS — Z98.890: ICD-10-CM

## 2021-10-21 DIAGNOSIS — T78.3XXA: Primary | ICD-10-CM

## 2021-10-21 PROCEDURE — 99284 EMERGENCY DEPT VISIT MOD MDM: CPT

## 2021-10-21 PROCEDURE — 96375 TX/PRO/DX INJ NEW DRUG ADDON: CPT

## 2021-10-21 PROCEDURE — 96372 THER/PROPH/DIAG INJ SC/IM: CPT

## 2021-10-21 PROCEDURE — 96374 THER/PROPH/DIAG INJ IV PUSH: CPT

## 2021-10-21 PROCEDURE — 96361 HYDRATE IV INFUSION ADD-ON: CPT

## 2021-10-21 RX ADMIN — FAMOTIDINE ONE MG: 10 INJECTION INTRAVENOUS at 10:49

## 2021-10-21 RX ADMIN — EPINEPHRINE ONE MG: 1 INJECTION PARENTERAL at 10:51

## 2021-10-21 RX ADMIN — SODIUM CHLORIDE ONE ML: 9 INJECTION, SOLUTION INTRAVENOUS at 10:49

## 2021-10-21 NOTE — NUR
TO ER BED 9, BISELF C/O FACIAL SWELLING UPON WAKING UP THIS MORNING, DENIES 
TRAUMA/PAIN, ACCORDING TO THE PATIENT IT HAPPENED BEFORE FEW TIMES, AAOX3, 
BREATHING EVEN AND NON LABORED, CONNECTED TO MONITOR, CHANGED INTO A GOWN

## 2021-11-17 ENCOUNTER — HOSPITAL ENCOUNTER (EMERGENCY)
Dept: HOSPITAL 54 - ER | Age: 61
Discharge: HOME | End: 2021-11-17
Payer: COMMERCIAL

## 2021-11-17 VITALS — HEIGHT: 72 IN | BODY MASS INDEX: 29.8 KG/M2 | WEIGHT: 220 LBS

## 2021-11-17 VITALS — DIASTOLIC BLOOD PRESSURE: 97 MMHG | SYSTOLIC BLOOD PRESSURE: 135 MMHG

## 2021-11-17 DIAGNOSIS — M54.50: ICD-10-CM

## 2021-11-17 DIAGNOSIS — F32.9: ICD-10-CM

## 2021-11-17 DIAGNOSIS — R10.9: Primary | ICD-10-CM

## 2021-11-17 DIAGNOSIS — Z98.890: ICD-10-CM

## 2021-11-17 DIAGNOSIS — I10: ICD-10-CM

## 2021-11-17 DIAGNOSIS — Z79.899: ICD-10-CM

## 2021-11-17 DIAGNOSIS — R11.2: ICD-10-CM

## 2021-11-17 LAB
ALBUMIN SERPL BCP-MCNC: 3.5 G/DL (ref 3.4–5)
ALP SERPL-CCNC: 113 U/L (ref 46–116)
ALT SERPL W P-5'-P-CCNC: 41 U/L (ref 12–78)
AST SERPL W P-5'-P-CCNC: 48 U/L (ref 15–37)
BASOPHILS # BLD AUTO: 0.1 K/UL (ref 0–0.2)
BASOPHILS NFR BLD AUTO: 1.9 % (ref 0–2)
BILIRUB DIRECT SERPL-MCNC: 0.1 MG/DL (ref 0–0.2)
BILIRUB SERPL-MCNC: 0.4 MG/DL (ref 0.2–1)
BUN SERPL-MCNC: 13 MG/DL (ref 7–18)
CALCIUM SERPL-MCNC: 8.5 MG/DL (ref 8.5–10.1)
CHLORIDE SERPL-SCNC: 99 MMOL/L (ref 98–107)
CO2 SERPL-SCNC: 29 MMOL/L (ref 21–32)
COLOR UR: (no result)
CREAT SERPL-MCNC: 0.9 MG/DL (ref 0.6–1.3)
EOSINOPHIL NFR BLD AUTO: 2.2 % (ref 0–6)
GLUCOSE SERPL-MCNC: 89 MG/DL (ref 74–106)
HCT VFR BLD AUTO: 38 % (ref 39–51)
HGB BLD-MCNC: 12.7 G/DL (ref 13.5–17.5)
LIPASE SERPL-CCNC: 275 U/L (ref 73–393)
LYMPHOCYTES NFR BLD AUTO: 1.7 K/UL (ref 0.8–4.8)
LYMPHOCYTES NFR BLD AUTO: 34.4 % (ref 20–44)
MCHC RBC AUTO-ENTMCNC: 33 G/DL (ref 31–36)
MCV RBC AUTO: 97 FL (ref 80–96)
MONOCYTES NFR BLD AUTO: 0.6 K/UL (ref 0.1–1.3)
MONOCYTES NFR BLD AUTO: 12.2 % (ref 2–12)
NEUTROPHILS # BLD AUTO: 2.4 K/UL (ref 1.8–8.9)
NEUTROPHILS NFR BLD AUTO: 49.3 % (ref 43–81)
PH UR STRIP: 5.5 [PH] (ref 5–8)
PLATELET # BLD AUTO: 160 K/UL (ref 150–450)
POTASSIUM SERPL-SCNC: 4 MMOL/L (ref 3.5–5.1)
PROT SERPL-MCNC: 8.2 G/DL (ref 6.4–8.2)
RBC # BLD AUTO: 3.91 MIL/UL (ref 4.5–6)
SODIUM SERPL-SCNC: 135 MMOL/L (ref 136–145)
UROBILINOGEN UR STRIP-MCNC: 0.2 EU/DL
WBC NRBC COR # BLD AUTO: 4.9 K/UL (ref 4.3–11)

## 2021-11-17 PROCEDURE — 99284 EMERGENCY DEPT VISIT MOD MDM: CPT

## 2021-11-17 PROCEDURE — 96375 TX/PRO/DX INJ NEW DRUG ADDON: CPT

## 2021-11-17 PROCEDURE — 80076 HEPATIC FUNCTION PANEL: CPT

## 2021-11-17 PROCEDURE — 87086 URINE CULTURE/COLONY COUNT: CPT

## 2021-11-17 PROCEDURE — 80048 BASIC METABOLIC PNL TOTAL CA: CPT

## 2021-11-17 PROCEDURE — 81003 URINALYSIS AUTO W/O SCOPE: CPT

## 2021-11-17 PROCEDURE — 85025 COMPLETE CBC W/AUTO DIFF WBC: CPT

## 2021-11-17 PROCEDURE — 74176 CT ABD & PELVIS W/O CONTRAST: CPT

## 2021-11-17 PROCEDURE — 96361 HYDRATE IV INFUSION ADD-ON: CPT

## 2021-11-17 PROCEDURE — 83690 ASSAY OF LIPASE: CPT

## 2021-11-17 PROCEDURE — 96374 THER/PROPH/DIAG INJ IV PUSH: CPT

## 2021-11-17 PROCEDURE — 36415 COLL VENOUS BLD VENIPUNCTURE: CPT

## 2021-11-17 NOTE — NUR
TO ER BED 14, C/O RIGHT FLANK PAIN/DYSURIA AND N/V X 2 DAYS, AAOX3, BREATHING 
EVEN AND NON LABORED, AWAITING MD LAWSON

## 2021-12-03 ENCOUNTER — HOSPITAL ENCOUNTER (EMERGENCY)
Dept: HOSPITAL 54 - ER | Age: 61
Discharge: HOME | End: 2021-12-03
Payer: COMMERCIAL

## 2021-12-03 VITALS — BODY MASS INDEX: 26.67 KG/M2 | WEIGHT: 176 LBS | HEIGHT: 68 IN

## 2021-12-03 DIAGNOSIS — F10.129: ICD-10-CM
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
## 2021-12-03 DIAGNOSIS — R07.89: Primary | ICD-10-CM

## 2021-12-03 DIAGNOSIS — F32.9: ICD-10-CM

## 2021-12-03 DIAGNOSIS — Z98.890: ICD-10-CM

## 2021-12-03 DIAGNOSIS — Z79.899: ICD-10-CM

## 2021-12-03 DIAGNOSIS — R60.0: ICD-10-CM

## 2021-12-03 DIAGNOSIS — I10: ICD-10-CM

## 2021-12-03 LAB
BASOPHILS # BLD AUTO: 0 K/UL (ref 0–0.2)
BASOPHILS NFR BLD AUTO: 0.3 % (ref 0–2)
BUN SERPL-MCNC: 14 MG/DL (ref 7–18)
CALCIUM SERPL-MCNC: 8.4 MG/DL (ref 8.5–10.1)
CHLORIDE SERPL-SCNC: 103 MMOL/L (ref 98–107)
CO2 SERPL-SCNC: 30 MMOL/L (ref 21–32)
CREAT SERPL-MCNC: 1.1 MG/DL (ref 0.6–1.3)
EOSINOPHIL NFR BLD AUTO: 1.4 % (ref 0–6)
GLUCOSE SERPL-MCNC: 127 MG/DL (ref 74–106)
HCT VFR BLD AUTO: 38 % (ref 39–51)
HGB BLD-MCNC: 12.7 G/DL (ref 13.5–17.5)
LYMPHOCYTES NFR BLD AUTO: 1.5 K/UL (ref 0.8–4.8)
LYMPHOCYTES NFR BLD AUTO: 31.5 % (ref 20–44)
MCHC RBC AUTO-ENTMCNC: 34 G/DL (ref 31–36)
MCV RBC AUTO: 97 FL (ref 80–96)
MONOCYTES NFR BLD AUTO: 0.3 K/UL (ref 0.1–1.3)
MONOCYTES NFR BLD AUTO: 7.2 % (ref 2–12)
NEUTROPHILS # BLD AUTO: 2.8 K/UL (ref 1.8–8.9)
NEUTROPHILS NFR BLD AUTO: 59.6 % (ref 43–81)
PLATELET # BLD AUTO: 188 K/UL (ref 150–450)
POTASSIUM SERPL-SCNC: 3.9 MMOL/L (ref 3.5–5.1)
RBC # BLD AUTO: 3.88 MIL/UL (ref 4.5–6)
SODIUM SERPL-SCNC: 142 MMOL/L (ref 136–145)
WBC NRBC COR # BLD AUTO: 4.7 K/UL (ref 4.3–11)

## 2021-12-03 PROCEDURE — G0480 DRUG TEST DEF 1-7 CLASSES: HCPCS

## 2021-12-03 NOTE — NUR
Patient discharged to home in stable condition. Written and verbal after care 
instructions given. Patient verbalizes understanding of instruction. PT noted 
with steady gait.

## 2021-12-03 NOTE — NUR
BIB RA 39,CHEST PAIN 8/10 RADIATING TO LEFT ARM X 1 HR. C/O HEADACHE 2/10. IN 
ROOM AIR AND DENIES SOB. RESPIRATION REGULAR AND UNLABORED. ATTACHED TO THE 
MONITOR. WARM BLANKET PROVIDED FOR COMFORT. WILL CONTINUE TO MONITOR THE 
PATIENT.

## 2021-12-04 VITALS — DIASTOLIC BLOOD PRESSURE: 77 MMHG | SYSTOLIC BLOOD PRESSURE: 149 MMHG

## 2022-01-05 ENCOUNTER — HOSPITAL ENCOUNTER (EMERGENCY)
Dept: HOSPITAL 12 - ER | Age: 62
Discharge: HOME | End: 2022-01-05
Payer: COMMERCIAL

## 2022-01-05 VITALS — WEIGHT: 220 LBS | BODY MASS INDEX: 29.8 KG/M2 | HEIGHT: 72 IN

## 2022-01-05 VITALS — SYSTOLIC BLOOD PRESSURE: 137 MMHG | DIASTOLIC BLOOD PRESSURE: 88 MMHG
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
## 2022-01-05 DIAGNOSIS — Z20.822: ICD-10-CM

## 2022-01-05 DIAGNOSIS — F10.129: ICD-10-CM

## 2022-01-05 DIAGNOSIS — Z59.01: ICD-10-CM

## 2022-01-05 DIAGNOSIS — E86.0: Primary | ICD-10-CM

## 2022-01-05 DIAGNOSIS — R42: ICD-10-CM

## 2022-01-05 LAB
ALP SERPL-CCNC: 101 U/L (ref 50–136)
ALT SERPL W/O P-5'-P-CCNC: 70 U/L (ref 16–63)
AST SERPL-CCNC: 129 U/L (ref 15–37)
BILIRUB DIRECT SERPL-MCNC: 0.1 MG/DL (ref 0–0.2)
BILIRUB SERPL-MCNC: 0.4 MG/DL (ref 0.2–1)
BUN SERPL-MCNC: 15 MG/DL (ref 7–18)
CHLORIDE SERPL-SCNC: 99 MMOL/L (ref 98–107)
CO2 SERPL-SCNC: 29 MMOL/L (ref 21–32)
CREAT SERPL-MCNC: 1.2 MG/DL (ref 0.6–1.3)
ETHANOL SERPL-MCNC: 102 MG/DL (ref 0–0)
GLUCOSE SERPL-MCNC: 102 MG/DL (ref 74–106)
HCT VFR BLD AUTO: 39.5 % (ref 36.7–47.1)
MCH RBC QN AUTO: 32.3 UUG (ref 23.8–33.4)
MCV RBC AUTO: 95.2 FL (ref 73–96.2)
PLATELET # BLD AUTO: 129 K/UL (ref 152–348)
POTASSIUM SERPL-SCNC: 4 MMOL/L (ref 3.5–5.1)
WS STN SPEC: 7.6 G/DL (ref 6.4–8.2)

## 2022-01-05 PROCEDURE — 80076 HEPATIC FUNCTION PANEL: CPT

## 2022-01-05 PROCEDURE — 80048 BASIC METABOLIC PNL TOTAL CA: CPT

## 2022-01-05 PROCEDURE — 99285 EMERGENCY DEPT VISIT HI MDM: CPT

## 2022-01-05 PROCEDURE — 71045 X-RAY EXAM CHEST 1 VIEW: CPT

## 2022-01-05 PROCEDURE — 87426 SARSCOV CORONAVIRUS AG IA: CPT

## 2022-01-05 PROCEDURE — G0480 DRUG TEST DEF 1-7 CLASSES: HCPCS

## 2022-01-05 PROCEDURE — 84145 PROCALCITONIN (PCT): CPT

## 2022-01-05 PROCEDURE — 80307 DRUG TEST PRSMV CHEM ANLYZR: CPT

## 2022-01-05 PROCEDURE — 83880 ASSAY OF NATRIURETIC PEPTIDE: CPT

## 2022-01-05 PROCEDURE — 85025 COMPLETE CBC W/AUTO DIFF WBC: CPT

## 2022-01-05 PROCEDURE — 87040 BLOOD CULTURE FOR BACTERIA: CPT

## 2022-01-05 PROCEDURE — A4663 DIALYSIS BLOOD PRESSURE CUFF: HCPCS

## 2022-01-05 PROCEDURE — 80320 DRUG SCREEN QUANTALCOHOLS: CPT

## 2022-01-05 PROCEDURE — 93005 ELECTROCARDIOGRAM TRACING: CPT

## 2022-01-05 PROCEDURE — 84484 ASSAY OF TROPONIN QUANT: CPT

## 2022-01-05 PROCEDURE — 36415 COLL VENOUS BLD VENIPUNCTURE: CPT

## 2022-01-05 PROCEDURE — 96374 THER/PROPH/DIAG INJ IV PUSH: CPT

## 2022-01-05 PROCEDURE — 96361 HYDRATE IV INFUSION ADD-ON: CPT

## 2022-01-05 SDOH — ECONOMIC STABILITY - HOUSING INSECURITY: SHELTERED HOMELESSNESS: Z59.01

## 2023-05-08 ENCOUNTER — HOSPITAL ENCOUNTER (EMERGENCY)
Dept: HOSPITAL 54 - ER | Age: 63
Discharge: HOME | End: 2023-05-08
Payer: COMMERCIAL

## 2023-05-08 VITALS
HEIGHT: 72 IN | BODY MASS INDEX: 29.8 KG/M2 | WEIGHT: 220 LBS | DIASTOLIC BLOOD PRESSURE: 75 MMHG | SYSTOLIC BLOOD PRESSURE: 136 MMHG

## 2023-05-08 DIAGNOSIS — F32.A: ICD-10-CM

## 2023-05-08 DIAGNOSIS — I10: ICD-10-CM

## 2023-05-08 DIAGNOSIS — Z79.899: ICD-10-CM

## 2023-05-08 DIAGNOSIS — J06.9: Primary | ICD-10-CM

## 2023-05-08 DIAGNOSIS — M79.604: ICD-10-CM
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
